# Patient Record
Sex: MALE | Race: WHITE | NOT HISPANIC OR LATINO | Employment: PART TIME | URBAN - METROPOLITAN AREA
[De-identification: names, ages, dates, MRNs, and addresses within clinical notes are randomized per-mention and may not be internally consistent; named-entity substitution may affect disease eponyms.]

---

## 2017-01-30 ENCOUNTER — DOCUMENTATION ONLY (OUTPATIENT)
Dept: LAB | Facility: CLINIC | Age: 57
End: 2017-01-30

## 2017-01-30 DIAGNOSIS — I10 HYPERTENSION GOAL BP (BLOOD PRESSURE) < 140/90: ICD-10-CM

## 2017-01-30 DIAGNOSIS — Z13.6 CARDIOVASCULAR SCREENING; LDL GOAL LESS THAN 130: Primary | ICD-10-CM

## 2017-01-30 DIAGNOSIS — Z00.00 ROUTINE GENERAL MEDICAL EXAMINATION AT A HEALTH CARE FACILITY: ICD-10-CM

## 2017-01-30 DIAGNOSIS — Z12.5 SCREENING FOR PROSTATE CANCER: ICD-10-CM

## 2017-01-30 DIAGNOSIS — Z11.59 NEED FOR HEPATITIS C SCREENING TEST: ICD-10-CM

## 2017-01-30 NOTE — PROGRESS NOTES
This patient has a lab only appointment on 2/2/2017 but does not have future orders. Please review, associate diagnosis and sign pending lab orders for the upcoming appointment.  He has an appointment with Dr. Casas on 2/13/2017.    Thank you,    [facility]

## 2017-02-02 DIAGNOSIS — Z13.6 CARDIOVASCULAR SCREENING; LDL GOAL LESS THAN 130: ICD-10-CM

## 2017-02-02 DIAGNOSIS — Z12.5 SCREENING FOR PROSTATE CANCER: ICD-10-CM

## 2017-02-02 DIAGNOSIS — I10 HYPERTENSION GOAL BP (BLOOD PRESSURE) < 140/90: ICD-10-CM

## 2017-02-02 DIAGNOSIS — Z00.00 ROUTINE GENERAL MEDICAL EXAMINATION AT A HEALTH CARE FACILITY: ICD-10-CM

## 2017-02-02 DIAGNOSIS — Z11.59 NEED FOR HEPATITIS C SCREENING TEST: ICD-10-CM

## 2017-02-02 LAB
ALBUMIN SERPL-MCNC: 3.9 G/DL (ref 3.4–5)
ALP SERPL-CCNC: 63 U/L (ref 40–150)
ALT SERPL W P-5'-P-CCNC: 47 U/L (ref 0–70)
ANION GAP SERPL CALCULATED.3IONS-SCNC: 12 MMOL/L (ref 3–14)
AST SERPL W P-5'-P-CCNC: 24 U/L (ref 0–45)
BILIRUB SERPL-MCNC: 0.6 MG/DL (ref 0.2–1.3)
BUN SERPL-MCNC: 15 MG/DL (ref 7–30)
CALCIUM SERPL-MCNC: 10.2 MG/DL (ref 8.5–10.1)
CHLORIDE SERPL-SCNC: 108 MMOL/L (ref 94–109)
CHOLEST SERPL-MCNC: 189 MG/DL
CO2 SERPL-SCNC: 23 MMOL/L (ref 20–32)
CREAT SERPL-MCNC: 1.03 MG/DL (ref 0.66–1.25)
ERYTHROCYTE [DISTWIDTH] IN BLOOD BY AUTOMATED COUNT: 13.9 % (ref 10–15)
GFR SERPL CREATININE-BSD FRML MDRD: 75 ML/MIN/1.7M2
GLUCOSE SERPL-MCNC: 83 MG/DL (ref 70–99)
HCT VFR BLD AUTO: 49.6 % (ref 40–53)
HDLC SERPL-MCNC: 40 MG/DL
HGB BLD-MCNC: 16.5 G/DL (ref 13.3–17.7)
LDLC SERPL CALC-MCNC: 126 MG/DL
MCH RBC QN AUTO: 28 PG (ref 26.5–33)
MCHC RBC AUTO-ENTMCNC: 33.3 G/DL (ref 31.5–36.5)
MCV RBC AUTO: 84 FL (ref 78–100)
NONHDLC SERPL-MCNC: 149 MG/DL
PLATELET # BLD AUTO: 212 10E9/L (ref 150–450)
POTASSIUM SERPL-SCNC: 4.7 MMOL/L (ref 3.4–5.3)
PROT SERPL-MCNC: 7.4 G/DL (ref 6.8–8.8)
PSA SERPL-ACNC: 1.5 UG/L (ref 0–4)
RBC # BLD AUTO: 5.89 10E12/L (ref 4.4–5.9)
SODIUM SERPL-SCNC: 143 MMOL/L (ref 133–144)
TRIGL SERPL-MCNC: 117 MG/DL
WBC # BLD AUTO: 7.9 10E9/L (ref 4–11)

## 2017-02-02 PROCEDURE — 80053 COMPREHEN METABOLIC PANEL: CPT | Performed by: FAMILY MEDICINE

## 2017-02-02 PROCEDURE — 36415 COLL VENOUS BLD VENIPUNCTURE: CPT | Performed by: FAMILY MEDICINE

## 2017-02-02 PROCEDURE — 85027 COMPLETE CBC AUTOMATED: CPT | Performed by: FAMILY MEDICINE

## 2017-02-02 PROCEDURE — 86803 HEPATITIS C AB TEST: CPT | Performed by: FAMILY MEDICINE

## 2017-02-02 PROCEDURE — G0103 PSA SCREENING: HCPCS | Performed by: FAMILY MEDICINE

## 2017-02-02 PROCEDURE — 80061 LIPID PANEL: CPT | Performed by: FAMILY MEDICINE

## 2017-02-03 LAB — HCV AB SERPL QL IA: NORMAL

## 2017-02-09 NOTE — PROGRESS NOTES
SUBJECTIVE:     CC: Jeff Brown is an 56 year old male who presents for preventative health visit.     Taking calcium supplement but NOT vitaminD.     Healthy Habits:          PROBLEMS TO ADD ON...    Today's PHQ-2 Score:   PHQ-2 ( 1999 Pfizer) 12/31/2015 12/30/2014   Q1: Little interest or pleasure in doing things 0 0   Q2: Feeling down, depressed or hopeless 0 0   PHQ-2 Score 0 0       Abuse: Current or Past(Physical, Sexual or Emotional)- No  Do you feel safe in your environment - No    Social History   Substance Use Topics     Smoking status: Never Smoker      Smokeless tobacco: Not on file     Alcohol Use: No     Limited ALCOHOL    Last PSA:   PSA   Date Value Ref Range Status   02/02/2017 1.50 0 - 4 ug/L Final     Comment:     Assay Method:  Chemiluminescence using Siemens Vista analyzer       Recent Labs   Lab Test  02/02/17   0705  12/31/15   0729  12/09/14   0821  03/27/13   0842   CHOL  189  217*  183  187   HDL  40  37*  39*  36*   LDL  126*  147*  121  127   TRIG  117  165*  113  116   CHOLHDLRATIO   --    --   4.7  5.1*   NHDL  149*  180*   --    --        Reviewed orders with patient. Reviewed health maintenance and updated orders accordingly - Yes    All Histories reviewed and updated in Epic.      ROS:  C: NEGATIVE for fever, chills, change in weight  I: NEGATIVE for worrisome rashes, moles or lesions  E: NEGATIVE for vision changes or irritation. One year ok. No major changes.   ENT: NEGATIVE for ear, mouth and throat problems. No marcus noted.   R: NEGATIVE for significant cough or SOB  CV: NEGATIVE for chest pain, palpitations or peripheral edema. Good exercise   GI: NEGATIVE for nausea, abdominal pain, heartburn, or change in bowel habits. No black or bloody stools except past hemorrhoids   male: negative for dysuria, hematuria, decreased urinary stream, erectile dysfunction, urethral discharge  M: NEGATIVE for significant arthralgias or myalgia  N: NEGATIVE for weakness, dizziness or  "paresthesias  E: NEGATIVE for temperature intolerance, skin/hair changes  H: NEGATIVE for bleeding problems  P: NEGATIVE for changes in mood or affect    Problem list, Medication list, Allergies, and Medical/Social/Surgical histories reviewed in [facility] and updated as appropriate.  OBJECTIVE:     There were no vitals taken for this visit.   /89 (BP Location: Right arm, Patient Position: Chair, Cuff Size: Adult Large)  Pulse 82  Temp 97.3  F (36.3  C) (Oral)  Ht 6' 1\" (1.854 m)  Wt 284 lb (128.8 kg)  BMI 37.47 kg/m2   EXAM:  GENERAL: healthy, alert and no distress  EYES: Eyes grossly normal to inspection, PERRL and conjunctivae and sclerae normal  HENT: ear canals and TM's normal, nose and mouth without ulcers or lesions  NECK: no adenopathy, no asymmetry, masses, or scars and thyroid normal to palpation  RESP: lungs clear to auscultation - no rales, rhonchi or wheezes  BREAST: normal without masses, tenderness or nipple discharge and no palpable axillary masses or adenopathy  CV: regular rate and rhythm, normal S1 S2, no S3 or S4, no murmur, click or rub, no peripheral edema and peripheral pulses strong  ABDOMEN: soft, nontender, no hepatosplenomegaly, no masses and bowel sounds normal   (male): patient deferred exam  MS: no gross musculoskeletal defects noted, no edema  SKIN: no suspicious lesions or rashes  NEURO: Normal strength and tone, mentation intact and speech normal  BACK: no CVA tenderness, no paralumbar tenderness  PSYCH: mentation appears normal, affect normal/bright  LYMPH: no cervical, supraclavicular, axillary, or inguinal adenopathy    ASSESSMENT/PLAN:       ASSESSMENT / PLAN:  (Z00.00) Routine general medical examination at a health care facility  (primary encounter diagnosis)  Comment: generally healthy and normal labs/exam.  Plan: Reviewed self mole/testicle check handout.  Repeat colonoscopy 4 years    (Z23) Need for prophylactic vaccination and inoculation against influenza  Plan: FLU VAC, " "SPLIT VIRUS IM > 3 YO (QUADRIVALENT)         [48728]            (I10) Hypertension goal BP (blood pressure) < 140/90  Comment: stable  Plan: valsartan-hydrochlorothiazide (DIOVAN HCT)         320-25 MG per tablet, amLODIPine (NORVASC) 5 MG        tablet        Exercise and 5 lbs weight loss. Chest pain or shortness of breath to er. Lipids slightly high. Continue asa/fish oil.     (E83.52) Hypercalcemia  Comment: likely from supplement  Plan: stop calcium and start vitaminD. Recheck in 6 months  Expected course and warning signs reviewed. Call/email with questions/concerns.           COUNSELING:  Reviewed preventive health counseling, as reflected in patient instructions       Regular exercise       Healthy diet/nutrition       Vision screening       Aspirin Prophylaxsis       Colon cancer screening       Prostate cancer screening       Osteoporosis Prevention/Bone Health         reports that he has never smoked. He does not have any smokeless tobacco history on file.    Estimated body mass index is 36.57 kg/(m^2) as calculated from the following:    Height as of 12/31/15: 6' 1.5\" (1.867 m).    Weight as of 12/31/15: 281 lb (127.461 kg).   Weight management plan: Discussed healthy diet and exercise guidelines and patient will follow up in 6 months in clinic to re-evaluate.    Counseling Resources:  ATP IV Guidelines  Pooled Cohorts Equation Calculator  FRAX Risk Assessment  ICSI Preventive Guidelines  Dietary Guidelines for Americans, 2010  USDA's MyPlate  ASA Prophylaxis  Lung CA Screening    Margarito Casas MD  [facility]  "

## 2017-02-13 ENCOUNTER — OFFICE VISIT (OUTPATIENT)
Dept: FAMILY MEDICINE | Facility: CLINIC | Age: 57
End: 2017-02-13
Payer: OTHER GOVERNMENT

## 2017-02-13 VITALS
BODY MASS INDEX: 37.64 KG/M2 | TEMPERATURE: 97.3 F | DIASTOLIC BLOOD PRESSURE: 89 MMHG | WEIGHT: 284 LBS | HEIGHT: 73 IN | HEART RATE: 82 BPM | SYSTOLIC BLOOD PRESSURE: 138 MMHG

## 2017-02-13 DIAGNOSIS — I10 HYPERTENSION GOAL BP (BLOOD PRESSURE) < 140/90: ICD-10-CM

## 2017-02-13 DIAGNOSIS — Z00.00 ROUTINE GENERAL MEDICAL EXAMINATION AT A HEALTH CARE FACILITY: Primary | ICD-10-CM

## 2017-02-13 DIAGNOSIS — Z23 NEED FOR PROPHYLACTIC VACCINATION AND INOCULATION AGAINST INFLUENZA: ICD-10-CM

## 2017-02-13 DIAGNOSIS — E83.52 HYPERCALCEMIA: ICD-10-CM

## 2017-02-13 PROCEDURE — 90471 IMMUNIZATION ADMIN: CPT | Performed by: FAMILY MEDICINE

## 2017-02-13 PROCEDURE — 99396 PREV VISIT EST AGE 40-64: CPT | Mod: 25 | Performed by: FAMILY MEDICINE

## 2017-02-13 PROCEDURE — 90686 IIV4 VACC NO PRSV 0.5 ML IM: CPT | Performed by: FAMILY MEDICINE

## 2017-02-13 RX ORDER — VALSARTAN AND HYDROCHLOROTHIAZIDE 320; 25 MG/1; MG/1
1 TABLET, FILM COATED ORAL DAILY
Qty: 90 TABLET | Refills: 3 | Status: SHIPPED | OUTPATIENT
Start: 2017-02-13 | End: 2018-02-19

## 2017-02-13 RX ORDER — AMLODIPINE BESYLATE 5 MG/1
5 TABLET ORAL DAILY
Qty: 90 TABLET | Refills: 3 | Status: SHIPPED | OUTPATIENT
Start: 2017-02-13 | End: 2018-02-19

## 2017-02-13 NOTE — NURSING NOTE
"Chief Complaint   Patient presents with     Physical       Initial /89 (BP Location: Right arm, Patient Position: Chair, Cuff Size: Adult Large)  Pulse 82  Temp 97.3  F (36.3  C) (Oral)  Ht 6' 1\" (1.854 m)  Wt 284 lb (128.8 kg)  BMI 37.47 kg/m2 Estimated body mass index is 37.47 kg/(m^2) as calculated from the following:    Height as of this encounter: 6' 1\" (1.854 m).    Weight as of this encounter: 284 lb (128.8 kg).  Medication Reconciliation: complete   Wilma Arce CMA    "

## 2017-02-13 NOTE — PROGRESS NOTES
Injectable Influenza Immunization Documentation    1.  Is the person to be vaccinated sick today?  No    2. Does the person to be vaccinated have an allergy to eggs or to a component of the vaccine?  No    3. Has the person to be vaccinated today ever had a serious reaction to influenza vaccine in the past?  No    4. Has the person to be vaccinated ever had Guillain-McGrann syndrome?  No     Form completed by Wilma Arce CMA

## 2017-02-13 NOTE — MR AVS SNAPSHOT
After Visit Summary   2/13/2017    Jeff Brown    MRN: [id number]           Patient Information     Date Of Birth          1960        Visit Information        Provider Department      2/13/2017 7:15 AM Margarito Casas MD [facility]        Today's Diagnoses     Routine general medical examination at a health care facility    -  1    Need for prophylactic vaccination and inoculation against influenza        Hypertension goal BP (blood pressure) < 140/90        Hypercalcemia          Care Instructions      Preventive Health Recommendations  Male Ages 50 - 64    Yearly exam:             See your health care provider every year in order to  o   Review health changes.   o   Discuss preventive care.    o   Review your medicines if your doctor has prescribed any.     Have a cholesterol test every 5 years, or more frequently if you are at risk for high cholesterol/heart disease.     Have a diabetes test (fasting glucose) every three years. If you are at risk for diabetes, you should have this test more often.     Have a colonoscopy at age 50, or have a yearly FIT test (stool test). These exams will check for colon cancer.      Talk with your health care provider about whether or not a prostate cancer screening test (PSA) is right for you.    You should be tested each year for STDs (sexually transmitted diseases), if you re at risk.     Shots: Get a flu shot each year. Get a tetanus shot every 10 years.     Nutrition:    Eat at least 5 servings of fruits and vegetables daily.     Eat whole-grain bread, whole-wheat pasta and brown rice instead of white grains and rice.     Talk to your provider about Calcium and Vitamin D.     Lifestyle    Exercise for at least 150 minutes a week (30 minutes a day, 5 days a week). This will help you control your weight and prevent disease.     Limit alcohol to one drink per day.     No smoking.     Wear sunscreen to prevent skin cancer.     See your  "dentist every six months for an exam and cleaning.     See your eye doctor every 1 to 2 years.            Follow-ups after your visit        Who to contact     If you have questions or need follow up information about today's clinic visit or your schedule please contact [facility] directly at [phone].  Normal or non-critical lab and imaging results will be communicated to you by MyChart, letter or phone within 4 business days after the clinic has received the results. If you do not hear from us within 7 days, please contact the clinic through [facility] or phone. If you have a critical or abnormal lab result, we will notify you by phone as soon as possible.  Submit refill requests through [employer] or call your pharmacy and they will forward the refill request to us. Please allow 3 business days for your refill to be completed.          Additional Information About Your Visit        MyChart Information     GiveNext gives you secure access to your electronic health record. If you see a primary care provider, you can also send messages to your care team and make appointments. If you have questions, please call your primary care clinic.  If you do not have a primary care provider, please call [phone] and they will assist you.        Care EveryWhere ID     This is your Care EveryWhere ID. This could be used by other organizations to access your [place] medical records  [id number]        Your Vitals Were     Pulse Temperature Height BMI (Body Mass Index)          82 97.3  F (36.3  C) (Oral) 6' 1\" (1.854 m) 37.47 kg/m2         Blood Pressure from Last 3 Encounters:   02/13/17 138/89   12/31/15 110/86   12/30/14 130/90    Weight from Last 3 Encounters:   02/13/17 284 lb (128.8 kg)   12/31/15 281 lb (127.5 kg)   12/30/14 278 lb (126.1 kg)              We Performed the Following     FLU VAC, SPLIT VIRUS IM > 3 YO (QUADRIVALENT) [43552]          Where to get your medicines      These medications were sent " to [facility] [address] [place], [place] - [address] [facility]  [address] [facility] [address]     Phone:  [phone]     amLODIPine 5 MG tablet    valsartan-hydrochlorothiazide 320-25 MG per tablet          Primary Care Provider Office Phone # Fax #    Margarito Vinod Casas [phone]       [facility] [address] [address] [facility] [address]        Thank you!     Thank you for choosing [facility]  for your care. Our goal is always to provide you with excellent care. Hearing back from our patients is one way we can continue to improve our services. Please take a few minutes to complete the written survey that you may receive in the mail after your visit with us. Thank you!             Your Updated Medication List - Protect others around you: Learn how to safely use, store and throw away your medicines at www.disposemymeds.org.          This list is accurate as of: 2/13/17  7:46 AM.  Always use your most recent med list.                   Brand Name Dispense Instructions for use    amLODIPine 5 MG tablet    NORVASC    90 tablet    Take 1 tablet (5 mg) by mouth daily Pharmacy ok to hold prescription until due       ASPIRIN LOW DOSE 81 MG tablet   Generic drug:  aspirin      Take 1 tablet by mouth daily.       FISH OIL      2 tablets daily gummy       * UNABLE TO FIND      Fiber gummy supplement       * UNABLE TO FIND      daily. MEDICATION NAME: Gummy Calcium Supplement       valsartan-hydrochlorothiazide 320-25 MG per tablet    DIOVAN HCT    90 tablet    Take 1 tablet by mouth daily Pharmacy ok to hold prescription until due       * Notice:  This list has 2 medication(s) that are the same as other medications prescribed for you. Read the directions carefully, and ask your doctor or other care provider to review them with you.

## 2017-02-13 NOTE — NURSING NOTE
Screening Questionnaire for Adult Immunization    Are you sick today?   No   Do you have allergies to medications, food, a vaccine component or latex?   No   Have you ever had a serious reaction after receiving a vaccination?   No   Do you have a long-term health problem with heart disease, lung disease, asthma, kidney disease, metabolic disease (e.g. diabetes), anemia, or other blood disorder?   No   Do you have cancer, leukemia, HIV/AIDS, or any other immune system problem?   No   In the past 3 months, have you taken medications that affect  your immune system, such as prednisone, other steroids, or anticancer drugs; drugs for the treatment of rheumatoid arthritis, Crohn s disease, or psoriasis; or have you had radiation treatments?   No   Have you had a seizure, or a brain or other nervous system problem?   No   During the past year, have you received a transfusion of blood or blood     products, or been given immune (gamma) globulin or antiviral drug?   No   For women: Are you pregnant or is there a chance you could become        pregnant during the next month?   No   Have you received any vaccinations in the past 4 weeks?   No     Immunization questionnaire answers were all negative.      MNVFC doesn't apply on this patient    Per orders of Dr. hi, injection of flu given by Wilma Arce. Patient instructed to remain in clinic for 20 minutes afterwards, and to report any adverse reaction to me immediately.       Screening performed by Wilma Arce on 2/13/2017 at 7:24 AM.

## 2018-02-12 ENCOUNTER — DOCUMENTATION ONLY (OUTPATIENT)
Dept: LAB | Facility: CLINIC | Age: 58
End: 2018-02-12

## 2018-02-12 DIAGNOSIS — I10 HYPERTENSION GOAL BP (BLOOD PRESSURE) < 140/90: ICD-10-CM

## 2018-02-12 DIAGNOSIS — Z00.00 ROUTINE GENERAL MEDICAL EXAMINATION AT A HEALTH CARE FACILITY: ICD-10-CM

## 2018-02-12 DIAGNOSIS — Z13.6 CARDIOVASCULAR SCREENING; LDL GOAL LESS THAN 130: Primary | ICD-10-CM

## 2018-02-12 DIAGNOSIS — Z12.5 SCREENING FOR PROSTATE CANCER: ICD-10-CM

## 2018-02-12 NOTE — PROGRESS NOTES
Patient has a lab appointment on 2/15/2018. Please sign pended orders and place any other future orders that are needed.    Thank you,  Nubia Judd

## 2018-02-15 DIAGNOSIS — Z13.6 CARDIOVASCULAR SCREENING; LDL GOAL LESS THAN 130: ICD-10-CM

## 2018-02-15 DIAGNOSIS — I10 HYPERTENSION GOAL BP (BLOOD PRESSURE) < 140/90: ICD-10-CM

## 2018-02-15 DIAGNOSIS — Z12.5 SCREENING FOR PROSTATE CANCER: ICD-10-CM

## 2018-02-15 DIAGNOSIS — Z00.00 ROUTINE GENERAL MEDICAL EXAMINATION AT A HEALTH CARE FACILITY: ICD-10-CM

## 2018-02-15 LAB
ALBUMIN SERPL-MCNC: 4.1 G/DL (ref 3.4–5)
ALBUMIN UR-MCNC: NEGATIVE MG/DL
ALP SERPL-CCNC: 75 U/L (ref 40–150)
ALT SERPL W P-5'-P-CCNC: 52 U/L (ref 0–70)
ANION GAP SERPL CALCULATED.3IONS-SCNC: 8 MMOL/L (ref 3–14)
APPEARANCE UR: CLEAR
AST SERPL W P-5'-P-CCNC: 25 U/L (ref 0–45)
BILIRUB SERPL-MCNC: 0.6 MG/DL (ref 0.2–1.3)
BILIRUB UR QL STRIP: NEGATIVE
BUN SERPL-MCNC: 13 MG/DL (ref 7–30)
CALCIUM SERPL-MCNC: 10 MG/DL (ref 8.5–10.1)
CHLORIDE SERPL-SCNC: 106 MMOL/L (ref 94–109)
CHOLEST SERPL-MCNC: 176 MG/DL
CO2 SERPL-SCNC: 24 MMOL/L (ref 20–32)
COLOR UR AUTO: YELLOW
CREAT SERPL-MCNC: 0.95 MG/DL (ref 0.66–1.25)
ERYTHROCYTE [DISTWIDTH] IN BLOOD BY AUTOMATED COUNT: 14.2 % (ref 10–15)
GFR SERPL CREATININE-BSD FRML MDRD: 82 ML/MIN/1.7M2
GLUCOSE SERPL-MCNC: 97 MG/DL (ref 70–99)
GLUCOSE UR STRIP-MCNC: NEGATIVE MG/DL
HCT VFR BLD AUTO: 51.7 % (ref 40–53)
HDLC SERPL-MCNC: 49 MG/DL
HGB BLD-MCNC: 17 G/DL (ref 13.3–17.7)
HGB UR QL STRIP: NEGATIVE
KETONES UR STRIP-MCNC: NEGATIVE MG/DL
LDLC SERPL CALC-MCNC: 114 MG/DL
LEUKOCYTE ESTERASE UR QL STRIP: NEGATIVE
MCH RBC QN AUTO: 27.7 PG (ref 26.5–33)
MCHC RBC AUTO-ENTMCNC: 32.9 G/DL (ref 31.5–36.5)
MCV RBC AUTO: 84 FL (ref 78–100)
NITRATE UR QL: NEGATIVE
NONHDLC SERPL-MCNC: 127 MG/DL
PH UR STRIP: 5 PH (ref 5–7)
PLATELET # BLD AUTO: 205 10E9/L (ref 150–450)
POTASSIUM SERPL-SCNC: 4 MMOL/L (ref 3.4–5.3)
PROT SERPL-MCNC: 8.1 G/DL (ref 6.8–8.8)
PSA SERPL-ACNC: 2.13 UG/L (ref 0–4)
RBC # BLD AUTO: 6.13 10E12/L (ref 4.4–5.9)
SODIUM SERPL-SCNC: 138 MMOL/L (ref 133–144)
SOURCE: NORMAL
SP GR UR STRIP: 1.02 (ref 1–1.03)
TRIGL SERPL-MCNC: 64 MG/DL
UROBILINOGEN UR STRIP-ACNC: 0.2 EU/DL (ref 0.2–1)
WBC # BLD AUTO: 9.4 10E9/L (ref 4–11)

## 2018-02-15 PROCEDURE — 85027 COMPLETE CBC AUTOMATED: CPT | Performed by: FAMILY MEDICINE

## 2018-02-15 PROCEDURE — 80061 LIPID PANEL: CPT | Performed by: FAMILY MEDICINE

## 2018-02-15 PROCEDURE — G0103 PSA SCREENING: HCPCS | Performed by: FAMILY MEDICINE

## 2018-02-15 PROCEDURE — 80053 COMPREHEN METABOLIC PANEL: CPT | Performed by: FAMILY MEDICINE

## 2018-02-15 PROCEDURE — 81003 URINALYSIS AUTO W/O SCOPE: CPT | Performed by: FAMILY MEDICINE

## 2018-02-15 PROCEDURE — 36415 COLL VENOUS BLD VENIPUNCTURE: CPT | Performed by: FAMILY MEDICINE

## 2018-02-19 ENCOUNTER — OFFICE VISIT (OUTPATIENT)
Dept: FAMILY MEDICINE | Facility: CLINIC | Age: 58
End: 2018-02-19
Payer: OTHER GOVERNMENT

## 2018-02-19 VITALS
WEIGHT: 279 LBS | HEART RATE: 88 BPM | OXYGEN SATURATION: 98 % | SYSTOLIC BLOOD PRESSURE: 122 MMHG | DIASTOLIC BLOOD PRESSURE: 82 MMHG | TEMPERATURE: 97.6 F | HEIGHT: 74 IN | BODY MASS INDEX: 35.81 KG/M2

## 2018-02-19 DIAGNOSIS — Z00.00 ROUTINE GENERAL MEDICAL EXAMINATION AT A HEALTH CARE FACILITY: Primary | ICD-10-CM

## 2018-02-19 DIAGNOSIS — Z13.6 CARDIOVASCULAR SCREENING; LDL GOAL LESS THAN 130: ICD-10-CM

## 2018-02-19 DIAGNOSIS — I10 HYPERTENSION GOAL BP (BLOOD PRESSURE) < 140/90: ICD-10-CM

## 2018-02-19 PROCEDURE — 99396 PREV VISIT EST AGE 40-64: CPT | Performed by: FAMILY MEDICINE

## 2018-02-19 RX ORDER — VALSARTAN AND HYDROCHLOROTHIAZIDE 320; 25 MG/1; MG/1
1 TABLET, FILM COATED ORAL DAILY
Qty: 90 TABLET | Refills: 3 | Status: SHIPPED | OUTPATIENT
Start: 2018-02-19 | End: 2019-02-26

## 2018-02-19 RX ORDER — AMLODIPINE BESYLATE 5 MG/1
5 TABLET ORAL DAILY
Qty: 90 TABLET | Refills: 3 | Status: SHIPPED | OUTPATIENT
Start: 2018-02-19 | End: 2019-02-26

## 2018-02-19 NOTE — PROGRESS NOTES
SUBJECTIVE:   CC: Jeff Brown is an 57 year old male who presents for preventative health visit.   Follow-up htn. Lipids ok. Stopped calcium but taking vitaminD.   Outside blood pressure reading ok.   Exercise - walking lots daily from work.   No chest pain or shortness of breath.   Physical   Annual:     Getting at least 3 servings of Calcium per day::  NO    Bi-annual eye exam::  Yes    Dental care twice a year::  Yes    Sleep apnea or symptoms of sleep apnea::  None    Diet::  Regular (no restrictions)    Frequency of exercise::  4-5 days/week    Duration of exercise::  30-45 minutes    Taking medications regularly::  Yes    Medication side effects::  None    Additional concerns today::  YES            PROBLEMS TO ADD ON...  Today's PHQ-2 Score:   PHQ-2 ( 1999 Pfizer) 2/19/2018   Q1: Little interest or pleasure in doing things 0   Q2: Feeling down, depressed or hopeless 0   PHQ-2 Score 0   Q1: Little interest or pleasure in doing things Not at all   Q2: Feeling down, depressed or hopeless Not at all   PHQ-2 Score 0       Abuse: Current or Past(Physical, Sexual or Emotional)- No  Do you feel safe in your environment - Yes    Social History   Substance Use Topics     Smoking status: Never Smoker     Smokeless tobacco: Not on file     Alcohol use No     Alcohol Use 2/19/2018   If you drink alcohol, do you typically have greater than 3 drinks per day OR greater than 7 drinks per week?   No       Last PSA:   PSA   Date Value Ref Range Status   02/15/2018 2.13 0 - 4 ug/L Final     Comment:     Assay Method:  Chemiluminescence using Siemens Vista analyzer       Reviewed orders with patient. Reviewed health maintenance and updated orders accordingly - Yes  Labs reviewed in EPIC    Reviewed and updated as needed this visit by clinical staff         Reviewed and updated as needed this visit by Provider            Review of Systems  C: NEGATIVE for fever, chills, change in weight  I: NEGATIVE for worrisome rashes,  "moles or lesions  E: NEGATIVE for vision changes or irritation. Eye exam in summer ok.   ENT: NEGATIVE for ear, mouth and throat problems. Winter congestion - no sudafed.   R: NEGATIVE for significant cough or SOB  CV: NEGATIVE for chest pain, palpitations or peripheral edema  GI: NEGATIVE for nausea, abdominal pain, heartburn, or change in bowel habits. No black or bloody stools.    male: negative for dysuria, hematuria, decreased urinary stream, erectile dysfunction, urethral discharge. Once nocturia hit/miss with fluids before bedtime.  M: NEGATIVE for significant arthralgias or myalgia  N: NEGATIVE for weakness, dizziness or paresthesias  E: NEGATIVE for temperature intolerance, skin/hair changes  H: NEGATIVE for bleeding problems  P: NEGATIVE for changes in mood or affect. Stress from mom moving into assisted living and she has dementia issues. Mom in [place]. No SUICIAL IDEATION OR HOMOCIDAL IDEATION OR ROBERTA.    OBJECTIVE:   There were no vitals taken for this visit.   /82  Pulse 88  Temp 97.6  F (36.4  C) (Oral)  Ht 6' 1.5\" (1.867 m)  Wt 279 lb (126.6 kg)  SpO2 98%  BMI 36.31 kg/m2  Physical Exam  GENERAL: healthy, alert and no distress  EYES: Eyes grossly normal to inspection, PERRL and conjunctivae and sclerae normal  HENT: ear canals and TM's normal, nose and mouth without ulcers or lesions  NECK: no adenopathy, no asymmetry, masses, or scars and thyroid normal to palpation  RESP: lungs clear to auscultation - no rales, rhonchi or wheezes  BREAST: normal without masses, tenderness or nipple discharge and no palpable axillary masses or adenopathy  CV: regular rate and rhythm, normal S1 S2, no S3 or S4, no murmur, click or rub, no peripheral edema and peripheral pulses strong  ABDOMEN: soft, nontender, no hepatosplenomegaly, no masses and bowel sounds normal   (male): patient deferred /rectal exams today.  MS: no gross musculoskeletal defects noted, no edema  SKIN: no suspicious " "lesions or rashes  NEURO: Normal strength and tone, mentation intact and speech normal  BACK: no CVA tenderness, no paralumbar tenderness  PSYCH: mentation appears normal, affect normal/bright  LYMPH: no cervical, supraclavicular, axillary, or inguinal adenopathy    ASSESSMENT/PLAN:   ASSESSMENT / PLAN:  (Z00.00) Routine general medical examination at a health care facility  (primary encounter diagnosis)  Comment: generally healthy and normal exam/labs.  Plan: Reviewed self mole/testicle check handout.  Continue vitamind    (I10) Hypertension goal BP (blood pressure) < 140/90  Comment: stable  Plan: valsartan-hydrochlorothiazide (DIOVAN HCT)         320-25 MG per tablet, amLODIPine (NORVASC) 5 MG        tablet        Continue exercise. Chest pain or shortness of breath to er. Reveiwed risks and side effects of medication  Return to clinic if worse. Continue self-monitor    (Z68.36) BMI 36.0-36.9,adult  Plan: exercise and diet discussed.     (Z13.6) CARDIOVASCULAR SCREENING; LDL GOAL LESS THAN 130  Comment: ok  Plan: continue fish oil/exercise.         COUNSELING:   Reviewed preventive health counseling, as reflected in patient instructions       Regular exercise       Colon cancer screening       Prostate cancer screening       Osteoporosis Prevention/Bone Health         reports that he has never smoked. He does not have any smokeless tobacco history on file.    Estimated body mass index is 37.47 kg/(m^2) as calculated from the following:    Height as of 2/13/17: 6' 1\" (1.854 m).    Weight as of 2/13/17: 284 lb (128.8 kg).   Weight management plan: Discussed healthy diet and exercise guidelines and patient will follow up in 12 months in clinic to re-evaluate.    Counseling Resources:  ATP IV Guidelines  Pooled Cohorts Equation Calculator  FRAX Risk Assessment  ICSI Preventive Guidelines  Dietary Guidelines for Americans, 2010  USDA's MyPlate  ASA Prophylaxis  Lung CA Screening    Margarito Casas MD  [facility]  Answers for HPI/ROS submitted by the patient on 2/19/2018   PHQ-2 Score: 0

## 2018-02-19 NOTE — MR AVS SNAPSHOT
After Visit Summary   2/19/2018    Jeff Brown    MRN: [id number]           Patient Information     Date Of Birth          1960        Visit Information        Provider Department      2/19/2018 11:35 AM Margarito Casas MD [facility]        Today's Diagnoses     Routine general medical examination at a health care facility    -  1    Hypertension goal BP (blood pressure) < 140/90        BMI 36.0-36.9,adult        CARDIOVASCULAR SCREENING; LDL GOAL LESS THAN 130           Follow-ups after your visit        Who to contact     If you have questions or need follow up information about today's clinic visit or your schedule please contact [facility] directly at [phone].  Normal or non-critical lab and imaging results will be communicated to you by MyChart, letter or phone within 4 business days after the clinic has received the results. If you do not hear from us within 7 days, please contact the clinic through virtual [employer]ens l[employer] or phone. If you have a critical or abnormal lab result, we will notify you by phone as soon as possible.  Submit refill requests through [employer] or call your pharmacy and they will forward the refill request to us. Please allow 3 business days for your refill to be completed.          Additional Information About Your Visit        MyChart Information     [employer] gives you secure access to your electronic health record. If you see a primary care provider, you can also send messages to your care team and make appointments. If you have questions, please call your primary care clinic.  If you do not have a primary care provider, please call [phone] and they will assist you.        Care EveryWhere ID     This is your Care EveryWhere ID. This could be used by other organizations to access your [place] medical records  [id number]        Your Vitals Were     Pulse Temperature Height Pulse Oximetry BMI (Body Mass Index)       88 97.6  F (36.4  C)  "(Oral) 6' 1.5\" (1.867 m) 98% 36.31 kg/m2        Blood Pressure from Last 3 Encounters:   02/19/18 122/82   02/13/17 138/89   12/31/15 110/86    Weight from Last 3 Encounters:   02/19/18 279 lb (126.6 kg)   02/13/17 284 lb (128.8 kg)   12/31/15 281 lb (127.5 kg)              Today, you had the following     No orders found for display         Where to get your medicines      These medications were sent to [facility] [address] [place], [place] - [address] [facility]  [address] [facility] [address]     Phone:  [phone]     amLODIPine 5 MG tablet    valsartan-hydrochlorothiazide 320-25 MG per tablet          Primary Care Provider Office Phone # Fax #    Margarito Vinod Casas [phone] [address] [facility] [address]        Equal Access to Services     [facility]: Hadii aad ku hadasho Soomaali, waaxda luqadaha, qaybta kaalmada adeegyada, waxay idiin hayjosen eliseo chungarasly casiano . So [facility] [phone].    ATENCIÓN: Si habla español, tiene a ascencio disposición servicios gratuitos de asistencia lingüística. [facility] [phone].    We comply with applicable federal civil rights laws and [place] laws. We do not discriminate on the basis of race, color, national origin, age, disability, sex, sexual orientation, or gender identity.            Thank you!     Thank you for choosing [facility]  for your care. Our goal is always to provide you with excellent care. Hearing back from our patients is one way we can continue to improve our services. Please take a few minutes to complete the written survey that you may receive in the mail after your visit with us. Thank you!             Your Updated Medication List - Protect others around you: Learn how to safely use, store and throw away your medicines at www.disposemymeds.org.          This list is accurate as of 2/19/18 12:08 PM.  Always use your most recent med list.                   Brand Name Dispense Instructions for use " Diagnosis    amLODIPine 5 MG tablet    NORVASC    90 tablet    Take 1 tablet (5 mg) by mouth daily Pharmacy ok to hold prescription until due    Hypertension goal BP (blood pressure) < 140/90       ASPIRIN LOW DOSE 81 MG tablet   Generic drug:  aspirin      Take 1 tablet by mouth daily.        FISH OIL      2 tablets daily gummy        UNABLE TO FIND      Fiber gummy supplement        valsartan-hydrochlorothiazide 320-25 MG per tablet    DIOVAN HCT    90 tablet    Take 1 tablet by mouth daily Pharmacy ok to hold prescription until due    Hypertension goal BP (blood pressure) < 140/90       VITAMIN C PO      Take 500 mg by mouth daily        VITAMIN D (CHOLECALCIFEROL) PO      Take 2,000 Units by mouth daily

## 2018-02-19 NOTE — NURSING NOTE
"Chief Complaint   Patient presents with     Physical       Initial /82  Pulse 88  Temp 97.6  F (36.4  C) (Oral)  Ht 6' 1.5\" (1.867 m)  Wt 279 lb (126.6 kg)  SpO2 98%  BMI 36.31 kg/m2 Estimated body mass index is 36.31 kg/(m^2) as calculated from the following:    Height as of this encounter: 6' 1.5\" (1.867 m).    Weight as of this encounter: 279 lb (126.6 kg).  Medication Reconciliation: complete   Wilma Arce CMA    "

## 2018-05-21 ENCOUNTER — OFFICE VISIT (OUTPATIENT)
Dept: FAMILY MEDICINE | Facility: CLINIC | Age: 58
End: 2018-05-21
Payer: OTHER GOVERNMENT

## 2018-05-21 VITALS
HEART RATE: 89 BPM | RESPIRATION RATE: 16 BRPM | TEMPERATURE: 98.4 F | DIASTOLIC BLOOD PRESSURE: 80 MMHG | OXYGEN SATURATION: 98 % | WEIGHT: 285 LBS | HEIGHT: 74 IN | SYSTOLIC BLOOD PRESSURE: 120 MMHG | BODY MASS INDEX: 36.57 KG/M2

## 2018-05-21 DIAGNOSIS — M79.671 PAIN IN BOTH FEET: Primary | ICD-10-CM

## 2018-05-21 DIAGNOSIS — M79.672 PAIN IN BOTH FEET: Primary | ICD-10-CM

## 2018-05-21 PROCEDURE — 99213 OFFICE O/P EST LOW 20 MIN: CPT | Performed by: FAMILY MEDICINE

## 2018-05-21 ASSESSMENT — PAIN SCALES - GENERAL: PAINLEVEL: MODERATE PAIN (4)

## 2018-05-21 NOTE — NURSING NOTE
"Chief Complaint   Patient presents with     Pain     Health Maintenance     utd       Initial /80  Pulse 89  Temp 98.4  F (36.9  C) (Oral)  Resp 16  Ht 6' 1.5\" (1.867 m)  Wt 285 lb (129.3 kg)  SpO2 98%  BMI 37.09 kg/m2 Estimated body mass index is 37.09 kg/(m^2) as calculated from the following:    Height as of this encounter: 6' 1.5\" (1.867 m).    Weight as of this encounter: 285 lb (129.3 kg).    Wilma Arce, RAMÓN    "

## 2018-05-21 NOTE — MR AVS SNAPSHOT
After Visit Summary   5/21/2018    Jeff Brown    MRN: [id number]           Patient Information     Date Of Birth          1960        Visit Information        Provider Department      5/21/2018 7:15 AM Margarito Casas MD [facility]        Today's Diagnoses     Pain in both feet    -  1       Follow-ups after your visit        Additional Services     PODIATRY/FOOT & ANKLE SURGERY REFERRAL       Your provider has referred you to: [facility]: [facility] [phone]   [email or web address]    Please be aware that coverage of these services is subject to the terms and limitations of your health insurance plan.  Call member services at your health plan with any benefit or coverage questions.      Please bring the following to your appointment:  >>   Any x-rays, CTs or MRIs which have been performed.  Contact the facility where they were done to arrange for  prior to your scheduled appointment.    >>   List of current medications   >>   This referral request   >>   Any documents/labs given to you for this referral                  Who to contact     If you have questions or need follow up information about today's clinic visit or your schedule please contact [facility] directly at [phone].  Normal or non-critical lab and imaging results will be communicated to you by MyChart, letter or phone within 4 business days after the clinic has received the results. If you do not hear from us within 7 days, please contact the clinic through MyChart or phone. If you have a critical or abnormal lab result, we will notify you by phone as soon as possible.  Submit refill requests through [employer] or call your pharmacy and they will forward the refill request to us. Please allow 3 business days for your refill to be completed.          Additional Information About Your Visit        [employer] Information     [employer] gives you secure  "access to your electronic health record. If you see a primary care provider, you can also send messages to your care team and make appointments. If you have questions, please call your primary care clinic.  If you do not have a primary care provider, please call [phone] and they will assist you.        Care EveryWhere ID     This is your Care EveryWhere ID. This could be used by other organizations to access your [place] medical records  [id number]        Your Vitals Were     Pulse Temperature Respirations Height Pulse Oximetry BMI (Body Mass Index)    89 98.4  F (36.9  C) (Oral) 16 6' 1.5\" (1.867 m) 98% 37.09 kg/m2       Blood Pressure from Last 3 Encounters:   05/21/18 120/80   02/19/18 122/82   02/13/17 138/89    Weight from Last 3 Encounters:   05/21/18 285 lb (129.3 kg)   02/19/18 279 lb (126.6 kg)   02/13/17 284 lb (128.8 kg)              We Performed the Following     PODIATRY/FOOT & ANKLE SURGERY REFERRAL          Today's Medication Changes          These changes are accurate as of 5/21/18  7:42 AM.  If you have any questions, ask your nurse or doctor.               Start taking these medicines.        Dose/Directions    nabumetone 750 MG tablet   Commonly known as:  RELAFEN   Used for:  Pain in both feet   Started by:  Margarito Casas MD        Dose:  750 mg   Take 1 tablet (750 mg) by mouth 2 times daily as needed for moderate pain (take with food)   Quantity:  30 tablet   Refills:  0            Where to get your medicines      These medications were sent to [facility] [address] [place] [address] [facility]  [address] [facility] [address]     Phone:  [phone]     nabumetone 750 MG tablet                Primary Care Provider Office Phone # Fax #    Margarito Casas [phone] [address] [facility] [address]        Equal Access to Services     [facility]: Hadii johanny daniels hadasho Soomaali, waaxda luqadaha, qaybta kaalmada adeegyada, sylvester lundbergin " huber casiano ah. So [facility] [phone].    ATENCIÓN: Si juanla dony, tiene a ascencio disposición servicios gratuitos de asistencia lingüística. Suzette flores [phone].    We comply with applicable federal civil rights laws and [place] laws. We do not discriminate on the basis of race, color, national origin, age, disability, sex, sexual orientation, or gender identity.            Thank you!     Thank you for choosing [facility]  for your care. Our goal is always to provide you with excellent care. Hearing back from our patients is one way we can continue to improve our services. Please take a few minutes to complete the written survey that you may receive in the mail after your visit with us. Thank you!             Your Updated Medication List - Protect others around you: Learn how to safely use, store and throw away your medicines at www.disposemymeds.org.          This list is accurate as of 5/21/18  7:42 AM.  Always use your most recent med list.                   Brand Name Dispense Instructions for use Diagnosis    amLODIPine 5 MG tablet    NORVASC    90 tablet    Take 1 tablet (5 mg) by mouth daily Pharmacy ok to hold prescription until due    Hypertension goal BP (blood pressure) < 140/90       ASPIRIN LOW DOSE 81 MG tablet   Generic drug:  aspirin      Take 1 tablet by mouth daily.        FISH OIL      2 tablets daily gummy        nabumetone 750 MG tablet    RELAFEN    30 tablet    Take 1 tablet (750 mg) by mouth 2 times daily as needed for moderate pain (take with food)    Pain in both feet       UNABLE TO FIND      Fiber gummy supplement        valsartan-hydrochlorothiazide 320-25 MG per tablet    DIOVAN HCT    90 tablet    Take 1 tablet by mouth daily Pharmacy ok to hold prescription until due    Hypertension goal BP (blood pressure) < 140/90       VITAMIN C PO      Take 500 mg by mouth daily        VITAMIN D (CHOLECALCIFEROL) PO      Take 2,000 Units by mouth daily

## 2018-05-21 NOTE — PROGRESS NOTES
"SUBJECTIVE:  Jeff Brown, a 57 year old male scheduled an appointment to discuss the following issues:  bilateral feet pain. right > left.  Was in [place] to help mom move. Was on feet all day. Metatarsal area. First happened before xmas 4-5 months ago. Changed shoes to see if helpful? No numbness/tingling. No back pain/radiations.   No toes pain.  Past Medical History:   Diagnosis Date     Hypertension        Past Surgical History:   Procedure Laterality Date     COLONOSCOPY  2005     ENT SURGERY      tonsils       Family History   Problem Relation Age of Onset     HEART DISEASE Father 65     htn       Social History   Substance Use Topics     Smoking status: Never Smoker     Smokeless tobacco: Never Used     Alcohol use No       ROS:  All other ROS negative  OBJECTIVE:  /80  Pulse 89  Temp 98.4  F (36.9  C) (Oral)  Resp 16  Ht 6' 1.5\" (1.867 m)  Wt 285 lb (129.3 kg)  SpO2 98%  BMI 37.09 kg/m2  EXAM:  GENERAL APPEARANCE: healthy, alert and no distress  MS: extremities normal- no gross deformities noted, no evidence of inflammation in joints, FROM in all extremities.  MS: tenderness bilateral 2nd MT joint heads.   SKIN: no suspicious lesions or rashes  NEURO: Normal strength and tone, sensory exam grossly normal, mentation intact and speech normal  PSYCH: mentation appears normal and affect normal/bright    ASSESSMENT / PLAN:  (M79.671,  M79.672) Pain in both feet  (primary encounter diagnosis)  Comment: likely metatarsalia   Plan: nabumetone (RELAFEN) 750 MG tablet,         PODIATRY/FOOT & ANKLE SURGERY REFERRAL        Reveiwed risks and side effects of medication  Heat/stretching. Limit relafen usage to 5-7 days and prn. Follow-up podiatry if worse/presists. Expected course and warning signs reviewed..Call/email with questions/concerns. Metatarsalia pads. Weight loss.     Margarito Casas"

## 2019-02-26 ENCOUNTER — OFFICE VISIT (OUTPATIENT)
Dept: FAMILY MEDICINE | Facility: CLINIC | Age: 59
End: 2019-02-26
Payer: OTHER GOVERNMENT

## 2019-02-26 VITALS
DIASTOLIC BLOOD PRESSURE: 86 MMHG | OXYGEN SATURATION: 97 % | WEIGHT: 289 LBS | BODY MASS INDEX: 37.09 KG/M2 | SYSTOLIC BLOOD PRESSURE: 130 MMHG | TEMPERATURE: 98.3 F | HEART RATE: 99 BPM | HEIGHT: 74 IN | RESPIRATION RATE: 20 BRPM

## 2019-02-26 DIAGNOSIS — Z13.6 CARDIOVASCULAR SCREENING; LDL GOAL LESS THAN 130: ICD-10-CM

## 2019-02-26 DIAGNOSIS — Z12.5 SCREENING PSA (PROSTATE SPECIFIC ANTIGEN): ICD-10-CM

## 2019-02-26 DIAGNOSIS — Z23 NEED FOR PROPHYLACTIC VACCINATION AND INOCULATION AGAINST INFLUENZA: ICD-10-CM

## 2019-02-26 DIAGNOSIS — Z00.00 ROUTINE HISTORY AND PHYSICAL EXAMINATION OF ADULT: Primary | ICD-10-CM

## 2019-02-26 DIAGNOSIS — I10 HYPERTENSION GOAL BP (BLOOD PRESSURE) < 140/90: ICD-10-CM

## 2019-02-26 PROCEDURE — 90471 IMMUNIZATION ADMIN: CPT | Performed by: FAMILY MEDICINE

## 2019-02-26 PROCEDURE — 90682 RIV4 VACC RECOMBINANT DNA IM: CPT | Performed by: FAMILY MEDICINE

## 2019-02-26 PROCEDURE — 99396 PREV VISIT EST AGE 40-64: CPT | Performed by: FAMILY MEDICINE

## 2019-02-26 RX ORDER — AMLODIPINE BESYLATE 5 MG/1
5 TABLET ORAL DAILY
Qty: 90 TABLET | Refills: 3 | Status: SHIPPED | OUTPATIENT
Start: 2019-02-26 | End: 2020-04-28

## 2019-02-26 RX ORDER — QUINAPRIL 40 MG/1
40 TABLET ORAL AT BEDTIME
Qty: 90 TABLET | Refills: 3 | Status: SHIPPED | OUTPATIENT
Start: 2019-02-26 | End: 2020-04-28

## 2019-02-26 RX ORDER — HYDROCHLOROTHIAZIDE 25 MG/1
25 TABLET ORAL DAILY
Qty: 90 TABLET | Refills: 1 | Status: SHIPPED | OUTPATIENT
Start: 2019-02-26 | End: 2020-05-07

## 2019-02-26 ASSESSMENT — ENCOUNTER SYMPTOMS
SHORTNESS OF BREATH: 0
PARESTHESIAS: 0
PALPITATIONS: 0
NAUSEA: 0
EYE PAIN: 0
CHILLS: 0
HEMATURIA: 0
DIARRHEA: 0
DIZZINESS: 0
HEADACHES: 0
FREQUENCY: 1
HEARTBURN: 0
WEAKNESS: 0
DYSURIA: 0
SORE THROAT: 0
MYALGIAS: 0
CONSTIPATION: 0
JOINT SWELLING: 0
COUGH: 0
FEVER: 0
ARTHRALGIAS: 0
HEMATOCHEZIA: 0
ABDOMINAL PAIN: 0
NERVOUS/ANXIOUS: 0

## 2019-02-26 ASSESSMENT — MIFFLIN-ST. JEOR: SCORE: 2192.71

## 2019-02-26 NOTE — NURSING NOTE
"Chief Complaint   Patient presents with     Physical       Initial /86   Pulse 99   Temp 98.3  F (36.8  C) (Oral)   Resp 20   Ht 1.867 m (6' 1.5\")   Wt 131.1 kg (289 lb)   SpO2 97%   BMI 37.61 kg/m   Estimated body mass index is 37.61 kg/m  as calculated from the following:    Height as of this encounter: 1.867 m (6' 1.5\").    Weight as of this encounter: 131.1 kg (289 lb).    Wilma Arce, RAMÓN    "

## 2019-02-26 NOTE — PROGRESS NOTES
SUBJECTIVE:   CC: Jeff Brown is an 58 year old male who presents for preventative health visit.   Follow-up htn/obesity.    Would like to change blood pressure med from diovan to accupril. Doesn't like hydrochlorothiazide because of urination. Outside blood pressure. No chest pain or shortness of breath.   Exercise - new job but needs more -taking more stairs.   Non-smoker.     Physical   Annual:     Getting at least 3 servings of Calcium per day:  NO    Bi-annual eye exam:  Yes    Dental care twice a year:  Yes    Sleep apnea or symptoms of sleep apnea:  None    Diet:  Regular (no restrictions)    Frequency of exercise:  1 day/week    Duration of exercise:  Less than 15 minutes    Taking medications regularly:  Yes    Medication side effects:  None    Additional concerns today:  No    PHQ-2 Total Score: 0          PROBLEMS TO ADD ON...    Today's PHQ-2 Score:   PHQ-2 ( 1999 Pfizer) 2/26/2019   Q1: Little interest or pleasure in doing things 0   Q2: Feeling down, depressed or hopeless 0   PHQ-2 Score 0   Q1: Little interest or pleasure in doing things Not at all   Q2: Feeling down, depressed or hopeless Not at all   PHQ-2 Score 0       Abuse: Current or Past(Physical, Sexual or Emotional)- No  Do you feel safe in your environment? Yes    Social History     Tobacco Use     Smoking status: Never Smoker     Smokeless tobacco: Never Used   Substance Use Topics     Alcohol use: No     Alcohol Use 2/26/2019   If you drink alcohol do you typically have greater than 3 drinks per day OR greater than 7 drinks per week? Not Applicable       Last PSA:   PSA   Date Value Ref Range Status   02/15/2018 2.13 0 - 4 ug/L Final     Comment:     Assay Method:  Chemiluminescence using Siemens Vista analyzer       Reviewed orders with patient. Reviewed health maintenance and updated orders accordingly - Yes  Labs reviewed in EPIC    Reviewed and updated as needed this visit by clinical staff         Reviewed and updated as needed  "this visit by Provider            Review of Systems   Constitutional: Negative for chills and fever.   HENT: Negative for congestion, ear pain, hearing loss and sore throat.    Eyes: Negative for pain and visual disturbance.   Respiratory: Negative for cough and shortness of breath.    Cardiovascular: Negative for chest pain, palpitations and peripheral edema.   Gastrointestinal: Negative for abdominal pain, constipation, diarrhea, heartburn, hematochezia and nausea.   Genitourinary: Positive for frequency. Negative for discharge, dysuria, genital sores, hematuria, impotence and urgency.   Musculoskeletal: Negative for arthralgias, joint swelling and myalgias.   Skin: Negative for rash.   Neurological: Negative for dizziness, weakness, headaches and paresthesias.   Psychiatric/Behavioral: Negative for mood changes. The patient is not nervous/anxious.      All other ROS. Frequency with hydrochlorothiazide. No dysuria/heamturia.  emotionally doing ok.     OBJECTIVE:   There were no vitals taken for this visit.  /86   Pulse 99   Temp 98.3  F (36.8  C) (Oral)   Resp 20   Ht 1.867 m (6' 1.5\")   Wt 131.1 kg (289 lb)   SpO2 97%   BMI 37.61 kg/m      Physical Exam  GENERAL: healthy, alert and no distress  EYES: Eyes grossly normal to inspection, PERRL and conjunctivae and sclerae normal  HENT: ear canals and TM's normal, nose and mouth without ulcers or lesions  NECK: no adenopathy, no asymmetry, masses, or scars and thyroid normal to palpation  RESP: lungs clear to auscultation - no rales, rhonchi or wheezes  BREAST: normal without masses, tenderness or nipple discharge and no palpable axillary masses or adenopathy  CV: regular rate and rhythm, normal S1 S2, no S3 or S4, no murmur, click or rub, no peripheral edema and peripheral pulses strong  ABDOMEN: soft, nontender, no hepatosplenomegaly, no masses and bowel sounds normal   (male): patient deferred exam. Denied pain/masses  MS: no gross musculoskeletal " defects noted, no edema  SKIN: no suspicious lesions or rashes  NEURO: Normal strength and tone, mentation intact and speech normal  BACK: no CVA tenderness, no paralumbar tenderness  PSYCH: mentation appears normal, affect normal/bright  LYMPH: no cervical, supraclavicular, axillary, or inguinal adenopathy    Diagnostic Test Results:  none     ASSESSMENT/PLAN:   ASSESSMENT / PLAN:  (Z00.00) Routine history and physical examination of adult  (primary encounter diagnosis)  Comment: generally healthy and normal exam  Plan: Comprehensive metabolic panel (BMP + Alb, Alk         Phos, ALT, AST, Total. Bili, TP), CBC with         platelets        Await labs. Reviewed self mole/testicle check handout.  Call/email with questions/concerns. vitaminD    (I10) Hypertension goal BP (blood pressure) < 140/90  Comment: ok but would like to go back to accupril and make hydrochlorothiazide prn  Plan: quinapril (ACCUPRIL) 40 MG tablet, amLODIPine         (NORVASC) 5 MG tablet, hydrochlorothiazide         (HYDRODIURIL) 25 MG tablet, Comprehensive         metabolic panel (BMP + Alb, Alk Phos, ALT, AST,        Total. Bili, TP)        Reveiwed risks and side effects of medication  Continue self-monitor. Exercise and 10 lbs weight loss. Recheck in 6 months  Sooner if worse. Chest pain or shortness of breath. Call/email with questions/concerns    (Z12.5) Screening PSA (prostate specific antigen)  Comment: ok in past. Patient deferred exam  Plan: PSA, screen            (Z13.6) CARDIOVASCULAR SCREENING; LDL GOAL LESS THAN 130  Comment: ok in past  Plan: Lipid Profile (Chol, Trig, HDL, LDL calc)        Exercise. ?fish oil.         COUNSELING:   Reviewed preventive health counseling, as reflected in patient instructions       Regular exercise       Healthy diet/nutrition       Vision screening       Colon cancer screening       Prostate cancer screening       Osteoporosis Prevention/Bone Health    BP Readings from Last 1 Encounters:   05/21/18  "120/80     Estimated body mass index is 37.09 kg/m  as calculated from the following:    Height as of 5/21/18: 1.867 m (6' 1.5\").    Weight as of 5/21/18: 129.3 kg (285 lb).      Weight management plan: Discussed healthy diet and exercise guidelines     reports that  has never smoked. he has never used smokeless tobacco.      Counseling Resources:  ATP IV Guidelines  Pooled Cohorts Equation Calculator  FRAX Risk Assessment  ICSI Preventive Guidelines  Dietary Guidelines for Americans, 2010  USDA's MyPlate  ASA Prophylaxis  Lung CA Screening    Margarito Casas MD  [facility]  "

## 2019-02-26 NOTE — PROGRESS NOTES
Injectable Influenza Immunization Documentation    1.  Is the person to be vaccinated sick today?   No    2. Does the person to be vaccinated have an allergy to a component   of the vaccine?   No  Egg Allergy Algorithm Link    3. Has the person to be vaccinated ever had a serious reaction   to influenza vaccine in the past?   No    4. Has the person to be vaccinated ever had Guillain-Barré syndrome?   No    Form completed by Wilma Arce CMA         Answers for HPI/ROS submitted by the patient on 2/26/2019   Annual Exam:  Frequency of exercise:: 1 day/week  Getting at least 3 servings of Calcium per day:: NO  Diet:: Regular (no restrictions)  Taking medications regularly:: Yes  Medication side effects:: None  Bi-annual eye exam:: Yes  Dental care twice a year:: Yes  Sleep apnea or symptoms of sleep apnea:: None  Positive for the following: frequency  Negative for the following: abdominal pain, Blood in stool, Blood in urine, chest pain, chills, congestion, constipation, cough, diarrhea, dizziness, ear pain, eye pain, nervous/anxious, fever, genital sores, headaches, hearing loss, heartburn, arthralgias, joint swelling, peripheral edema, mood changes, myalgias, nausea, dysuria, palpitations, Skin sensation changes, sore throat, urgency, rash, shortness of breath, visual disturbance, weakness  impotence: No  penile discharge: No  Additional concerns today:: No  Duration of exercise:: Less than 15 minutes

## 2019-03-02 DIAGNOSIS — I10 HYPERTENSION GOAL BP (BLOOD PRESSURE) < 140/90: ICD-10-CM

## 2019-03-02 DIAGNOSIS — Z00.00 ROUTINE HISTORY AND PHYSICAL EXAMINATION OF ADULT: ICD-10-CM

## 2019-03-02 DIAGNOSIS — Z12.5 SCREENING PSA (PROSTATE SPECIFIC ANTIGEN): ICD-10-CM

## 2019-03-02 DIAGNOSIS — Z13.6 CARDIOVASCULAR SCREENING; LDL GOAL LESS THAN 130: ICD-10-CM

## 2019-03-02 LAB
ERYTHROCYTE [DISTWIDTH] IN BLOOD BY AUTOMATED COUNT: 14.7 % (ref 10–15)
HCT VFR BLD AUTO: 50.9 % (ref 40–53)
HGB BLD-MCNC: 16.8 G/DL (ref 13.3–17.7)
MCH RBC QN AUTO: 28 PG (ref 26.5–33)
MCHC RBC AUTO-ENTMCNC: 33 G/DL (ref 31.5–36.5)
MCV RBC AUTO: 85 FL (ref 78–100)
PLATELET # BLD AUTO: 217 10E9/L (ref 150–450)
RBC # BLD AUTO: 5.99 10E12/L (ref 4.4–5.9)
WBC # BLD AUTO: 9.2 10E9/L (ref 4–11)

## 2019-03-02 PROCEDURE — 80061 LIPID PANEL: CPT | Performed by: FAMILY MEDICINE

## 2019-03-02 PROCEDURE — 36415 COLL VENOUS BLD VENIPUNCTURE: CPT | Performed by: FAMILY MEDICINE

## 2019-03-02 PROCEDURE — 80053 COMPREHEN METABOLIC PANEL: CPT | Performed by: FAMILY MEDICINE

## 2019-03-02 PROCEDURE — 85027 COMPLETE CBC AUTOMATED: CPT | Performed by: FAMILY MEDICINE

## 2019-03-02 PROCEDURE — G0103 PSA SCREENING: HCPCS | Performed by: FAMILY MEDICINE

## 2019-03-04 LAB
ALBUMIN SERPL-MCNC: 3.9 G/DL (ref 3.4–5)
ALP SERPL-CCNC: 73 U/L (ref 40–150)
ALT SERPL W P-5'-P-CCNC: 50 U/L (ref 0–70)
ANION GAP SERPL CALCULATED.3IONS-SCNC: 5 MMOL/L (ref 3–14)
AST SERPL W P-5'-P-CCNC: 29 U/L (ref 0–45)
BILIRUB SERPL-MCNC: 0.4 MG/DL (ref 0.2–1.3)
BUN SERPL-MCNC: 13 MG/DL (ref 7–30)
CALCIUM SERPL-MCNC: 9.9 MG/DL (ref 8.5–10.1)
CHLORIDE SERPL-SCNC: 110 MMOL/L (ref 94–109)
CHOLEST SERPL-MCNC: 190 MG/DL
CO2 SERPL-SCNC: 26 MMOL/L (ref 20–32)
CREAT SERPL-MCNC: 1.02 MG/DL (ref 0.66–1.25)
GFR SERPL CREATININE-BSD FRML MDRD: 81 ML/MIN/{1.73_M2}
GLUCOSE SERPL-MCNC: 97 MG/DL (ref 70–99)
HDLC SERPL-MCNC: 39 MG/DL
LDLC SERPL CALC-MCNC: 118 MG/DL
NONHDLC SERPL-MCNC: 151 MG/DL
POTASSIUM SERPL-SCNC: 4 MMOL/L (ref 3.4–5.3)
PROT SERPL-MCNC: 7.3 G/DL (ref 6.8–8.8)
PSA SERPL-ACNC: 1.3 UG/L (ref 0–4)
SODIUM SERPL-SCNC: 141 MMOL/L (ref 133–144)
TRIGL SERPL-MCNC: 163 MG/DL

## 2020-02-23 ENCOUNTER — HEALTH MAINTENANCE LETTER (OUTPATIENT)
Age: 60
End: 2020-02-23

## 2020-03-03 ENCOUNTER — DOCUMENTATION ONLY (OUTPATIENT)
Dept: FAMILY MEDICINE | Facility: CLINIC | Age: 60
End: 2020-03-03

## 2020-03-03 DIAGNOSIS — I10 HYPERTENSION GOAL BP (BLOOD PRESSURE) < 140/90: Primary | ICD-10-CM

## 2020-03-03 DIAGNOSIS — Z00.00 ROUTINE HISTORY AND PHYSICAL EXAMINATION OF ADULT: ICD-10-CM

## 2020-03-03 DIAGNOSIS — Z12.5 SCREENING FOR PROSTATE CANCER: ICD-10-CM

## 2020-03-03 DIAGNOSIS — Z13.6 CARDIOVASCULAR SCREENING; LDL GOAL LESS THAN 130: ICD-10-CM

## 2020-03-03 NOTE — PROGRESS NOTES
.Please place or confirm pending lab orders for upcoming lab appointment on 3/4/20  Thank you,  Mally

## 2020-03-03 NOTE — PROGRESS NOTES
Please review lab orders. Sign and close encounter. Pamela Woods TC/Pt Rep    Physical: 3/19/2020

## 2020-03-04 DIAGNOSIS — Z12.5 SCREENING FOR PROSTATE CANCER: ICD-10-CM

## 2020-03-04 DIAGNOSIS — Z00.00 ROUTINE HISTORY AND PHYSICAL EXAMINATION OF ADULT: ICD-10-CM

## 2020-03-04 DIAGNOSIS — Z13.6 CARDIOVASCULAR SCREENING; LDL GOAL LESS THAN 130: ICD-10-CM

## 2020-03-04 DIAGNOSIS — I10 HYPERTENSION GOAL BP (BLOOD PRESSURE) < 140/90: ICD-10-CM

## 2020-03-04 LAB
ALBUMIN SERPL-MCNC: 3.9 G/DL (ref 3.4–5)
ALP SERPL-CCNC: 79 U/L (ref 40–150)
ALT SERPL W P-5'-P-CCNC: 41 U/L (ref 0–70)
ANION GAP SERPL CALCULATED.3IONS-SCNC: 5 MMOL/L (ref 3–14)
AST SERPL W P-5'-P-CCNC: 18 U/L (ref 0–45)
BILIRUB SERPL-MCNC: 0.6 MG/DL (ref 0.2–1.3)
BUN SERPL-MCNC: 13 MG/DL (ref 7–30)
CALCIUM SERPL-MCNC: 9.9 MG/DL (ref 8.5–10.1)
CHLORIDE SERPL-SCNC: 106 MMOL/L (ref 94–109)
CHOLEST SERPL-MCNC: 187 MG/DL
CO2 SERPL-SCNC: 27 MMOL/L (ref 20–32)
CREAT SERPL-MCNC: 0.99 MG/DL (ref 0.66–1.25)
ERYTHROCYTE [DISTWIDTH] IN BLOOD BY AUTOMATED COUNT: 14.3 % (ref 10–15)
GFR SERPL CREATININE-BSD FRML MDRD: 83 ML/MIN/{1.73_M2}
GLUCOSE SERPL-MCNC: 99 MG/DL (ref 70–99)
HCT VFR BLD AUTO: 51.3 % (ref 40–53)
HDLC SERPL-MCNC: 41 MG/DL
HGB BLD-MCNC: 16.9 G/DL (ref 13.3–17.7)
LDLC SERPL CALC-MCNC: 124 MG/DL
MCH RBC QN AUTO: 27.6 PG (ref 26.5–33)
MCHC RBC AUTO-ENTMCNC: 32.9 G/DL (ref 31.5–36.5)
MCV RBC AUTO: 84 FL (ref 78–100)
NONHDLC SERPL-MCNC: 146 MG/DL
PLATELET # BLD AUTO: 253 10E9/L (ref 150–450)
POTASSIUM SERPL-SCNC: 4.4 MMOL/L (ref 3.4–5.3)
PROT SERPL-MCNC: 8.2 G/DL (ref 6.8–8.8)
PSA SERPL-ACNC: 1.58 UG/L (ref 0–4)
RBC # BLD AUTO: 6.12 10E12/L (ref 4.4–5.9)
SODIUM SERPL-SCNC: 138 MMOL/L (ref 133–144)
TRIGL SERPL-MCNC: 110 MG/DL
WBC # BLD AUTO: 11 10E9/L (ref 4–11)

## 2020-03-04 PROCEDURE — 85027 COMPLETE CBC AUTOMATED: CPT | Performed by: FAMILY MEDICINE

## 2020-03-04 PROCEDURE — 80061 LIPID PANEL: CPT | Performed by: FAMILY MEDICINE

## 2020-03-04 PROCEDURE — 36415 COLL VENOUS BLD VENIPUNCTURE: CPT | Performed by: FAMILY MEDICINE

## 2020-03-04 PROCEDURE — 80053 COMPREHEN METABOLIC PANEL: CPT | Performed by: FAMILY MEDICINE

## 2020-03-04 PROCEDURE — G0103 PSA SCREENING: HCPCS | Performed by: FAMILY MEDICINE

## 2020-03-04 NOTE — PROGRESS NOTES
...Your patient was in for lab test today and there are pending orders in Epic. I drew JIC tubes.  Please review and tag any additional orders to the lab appointment or enter orders as a future and I will watch for them.  Thank you   Yessi   @ [facility]

## 2020-04-27 DIAGNOSIS — I10 HYPERTENSION GOAL BP (BLOOD PRESSURE) < 140/90: ICD-10-CM

## 2020-04-27 NOTE — LETTER
April 29, 2020    Max K Enevoldsen  [address]  [place] [address]    Dear Jeff,       We recently received a refill request for quinapril and amlodipine.  We have refilled this for a one time 30 day supply only because you are due for a:    Blood pressure/medication check office visit      Please call at your earliest convenience so that there will not be a delay with your future refills.          Thank you,   Your [facility] Team/  [phone]

## 2020-04-28 RX ORDER — AMLODIPINE BESYLATE 5 MG/1
TABLET ORAL
Qty: 90 TABLET | Refills: 0 | Status: SHIPPED | OUTPATIENT
Start: 2020-04-28 | End: 2020-05-07

## 2020-04-28 RX ORDER — QUINAPRIL 40 MG/1
TABLET ORAL
Qty: 30 TABLET | Refills: 0 | Status: SHIPPED | OUTPATIENT
Start: 2020-04-28 | End: 2020-05-07

## 2020-04-28 NOTE — TELEPHONE ENCOUNTER
Routing refill request to provider for review/approval because:    Patient failed:  Blood pressure under 140/90 in past 12 months    BP Readings from Last 3 Encounters:   02/26/19 130/86   05/21/18 120/80   02/19/18 122/82     Crayl Irving BSN, RN

## 2020-04-28 NOTE — TELEPHONE ENCOUNTER
Patient need md appointment and we can do labs at that time in 3-4 weeks. Please help set-up. thanks. Margarito Casas MD

## 2020-05-05 NOTE — PROGRESS NOTES
"Jeff Brown is a 59 year old male who is being evaluated via a billable video visit.    Follow-up htn/obesity. Outside blood pressure readings stable. Had normal labs 2months ago - LOS=643 and cr/potassium ok. Normal psa. Exercise more with warmer weather - more walking. 1-2.5 miles/day. No chest pain or shortness of breath. No nausea, vomiting or diarrhea. No urine changes or hematuria. Emotionally doing ok.   The patient has been notified of following:     \"This video visit will be conducted via a call between you and your physician/provider. We have found that certain health care needs can be provided without the need for an in-person physical exam.  This service lets us provide the care you need with a video conversation.  If a prescription is necessary we can send it directly to your pharmacy.  If lab work is needed we can place an order for that and you can then stop by our lab to have the test done at a later time.    Video visits are billed at different rates depending on your insurance coverage.  Please reach out to your insurance provider with any questions.    If during the course of the call the physician/provider feels a video visit is not appropriate, you will not be charged for this service.\"    Patient has given verbal consent for Video visit? Yes    How would you like to obtain your AVS? Mail a copy    Patient would like the video invitation sent by: Text to cell phone: [phone]    Will anyone else be joining your video visit? No    Subjective     Jeff Brown is a 59 year old male who presents to clinic today for the following health issues:    HPI    Med check, Amlodipine, Quinapril and hydrochlorothiazide.    PROBLEMS TO ADD ON...    Patient Active Problem List   Diagnosis     CARDIOVASCULAR SCREENING; LDL GOAL LESS THAN 130     Hypertension goal BP (blood pressure) < 140/90     BMI 36.0-36.9,adult     Seasonal allergic rhinitis     Advanced directives, counseling/discussion     Past " "Surgical History:   Procedure Laterality Date     COLONOSCOPY  2005     ENT SURGERY      tonsils       Social History     Tobacco Use     Smoking status: Never Smoker     Smokeless tobacco: Never Used   Substance Use Topics     Alcohol use: No     Family History   Problem Relation Age of Onset     Other Cancer Mother         Skin cancer.  Removed last year.     Thyroid Disease Mother      Heart Disease Father 65        htn     Hypertension Father         1st heart attack late 50s.  2nd mid-60s.           Reviewed and updated as needed this visit by Provider         Review of Systems   All other ROS negative.       Objective    There were no vitals taken for this visit.  Estimated body mass index is 37.61 kg/m  as calculated from the following:    Height as of 2/26/19: 1.867 m (6' 1.5\").    Weight as of 2/26/19: 131.1 kg (289 lb).  Physical Exam     GENERAL: healthy, alert and no distress  PSYCH: mentation appears normal, affect normal/bright, judgement and insight intact, normal speech}        ASSESSMENT / PLAN:  (I10) Hypertension goal BP (blood pressure) < 140/90  Comment: stable  Plan: amLODIPine (NORVASC) 5 MG tablet, quinapril         (ACCUPRIL) 40 MG tablet, hydrochlorothiazide         (HYDRODIURIL) 25 MG tablet        Continue self-monitor/limit sodium and exercise. Chest pain or shortness of breath to er. Recheck in 6 months  In clinic - sooner if worse/new issues. Call/email with questions/concerns. Reveiwed risks and side effects of medication                Type of service:  telephone 12 minutes-  Unable to get video to work    No follow-ups on file.       Margarito Casas MD        "

## 2020-05-07 ENCOUNTER — VIRTUAL VISIT (OUTPATIENT)
Dept: FAMILY MEDICINE | Facility: CLINIC | Age: 60
End: 2020-05-07
Payer: OTHER GOVERNMENT

## 2020-05-07 DIAGNOSIS — I10 HYPERTENSION GOAL BP (BLOOD PRESSURE) < 140/90: ICD-10-CM

## 2020-05-07 PROCEDURE — 99213 OFFICE O/P EST LOW 20 MIN: CPT | Mod: 95 | Performed by: FAMILY MEDICINE

## 2020-05-07 RX ORDER — QUINAPRIL 40 MG/1
TABLET ORAL
Qty: 90 TABLET | Refills: 2 | Status: SHIPPED | OUTPATIENT
Start: 2020-05-07 | End: 2021-01-28

## 2020-05-07 RX ORDER — HYDROCHLOROTHIAZIDE 25 MG/1
25 TABLET ORAL DAILY
Qty: 90 TABLET | Refills: 2 | Status: SHIPPED | OUTPATIENT
Start: 2020-05-07 | End: 2021-01-28

## 2020-05-07 RX ORDER — AMLODIPINE BESYLATE 5 MG/1
5 TABLET ORAL DAILY
Qty: 90 TABLET | Refills: 2 | Status: SHIPPED | OUTPATIENT
Start: 2020-05-07 | End: 2021-01-28

## 2020-12-12 ENCOUNTER — HEALTH MAINTENANCE LETTER (OUTPATIENT)
Age: 60
End: 2020-12-12

## 2021-01-25 ASSESSMENT — ENCOUNTER SYMPTOMS
NERVOUS/ANXIOUS: 0
DIARRHEA: 0
MYALGIAS: 0
ABDOMINAL PAIN: 0
ARTHRALGIAS: 0
COUGH: 0
HEMATURIA: 0
NAUSEA: 0
DIZZINESS: 0
PARESTHESIAS: 0
JOINT SWELLING: 0
CHILLS: 0
WEAKNESS: 0
HEARTBURN: 0
SHORTNESS OF BREATH: 0
CONSTIPATION: 0
PALPITATIONS: 0
HEADACHES: 0
FEVER: 0
SORE THROAT: 0
EYE PAIN: 0
HEMATOCHEZIA: 0
DYSURIA: 0
FREQUENCY: 0

## 2021-01-27 NOTE — PROGRESS NOTES
"  3  SUBJECTIVE:   CC: Jeff Brown is an 60 year old male who presents for preventive health visit.     {Split Bill scripting  The purpose of this visit is to discuss your medical history and prevent health problems before you are sick. You may be responsible for a co-pay, coinsurance, or deductible if your visit today includes services such as checking on a sore throat, having an x-ray or lab test, or treating and evaluating a new or existing condition :738105}  Patient has been advised of split billing requirements and indicates understanding: {Yes and No:223835}  Healthy Habits:    Do you get at least three servings of calcium containing foods daily (dairy, green leafy vegetables, etc.)? { :502224::\"yes\"}    Amount of exercise or daily activities, outside of work: { :407916}    Problems taking medications regularly { :701442::\"No\"}    Medication side effects: { :017449::\"No\"}    Have you had an eye exam in the past two years? { :264563}    Do you see a dentist twice per year? { :787817}    Do you have sleep apnea, excessive snoring or daytime drowsiness?{ :661985}  {Outside tests to abstract? :081734}    {additional problems to add (Optional):644819}    Today's PHQ-2 Score:   PHQ-2 ( 1999 Pfizer) 1/25/2021 5/7/2020   Q1: Little interest or pleasure in doing things 0 0   Q2: Feeling down, depressed or hopeless 0 0   PHQ-2 Score 0 0   Q1: Little interest or pleasure in doing things Not at all -   Q2: Feeling down, depressed or hopeless Not at all -   PHQ-2 Score 0 -     {PHQ-2 LOOK IN ASSESSMENTS (Optional) :839203}  Abuse: Current or Past(Physical, Sexual or Emotional)- {YES/NO/NA:090414}  Do you feel safe in your environment? {YES/NO/NA:507579}    Have you ever done Advance Care Planning? (For example, a Health Directive, POLST, or a discussion with a medical provider or your loved ones about your wishes): { :423858}    Social History     Tobacco Use     Smoking status: Never Smoker     Smokeless tobacco: " "Never Used   Substance Use Topics     Alcohol use: No     If you drink alcohol do you typically have >3 drinks per day or >7 drinks per week? {ETOH :480704}                      Last PSA:   PSA   Date Value Ref Range Status   03/04/2020 1.58 0 - 4 ug/L Final     Comment:     Assay Method:  Chemiluminescence using Siemens Vista analyzer       Reviewed orders with patient. Reviewed health maintenance and updated orders accordingly - {Yes/No:590862::\"Yes\"}  {Chronicprobdata (Optional):384847}    Reviewed and updated as needed this visit by clinical staff                 Reviewed and updated as needed this visit by Provider                {HISTORY OPTIONS (Optional):194605}    ROS:  { :527745::\"CONSTITUTIONAL: NEGATIVE for fever, chills, change in weight\",\"INTEGUMENTARY/SKIN: NEGATIVE for worrisome rashes, moles or lesions\",\"EYES: NEGATIVE for vision changes or irritation\",\"ENT: NEGATIVE for ear, mouth and throat problems\",\"RESP: NEGATIVE for significant cough or SOB\",\"CV: NEGATIVE for chest pain, palpitations or peripheral edema\",\"GI: NEGATIVE for nausea, abdominal pain, heartburn, or change in bowel habits\",\" male: negative for dysuria, hematuria, decreased urinary stream, erectile dysfunction, urethral discharge\",\"MUSCULOSKELETAL: NEGATIVE for significant arthralgias or myalgia\",\"NEURO: NEGATIVE for weakness, dizziness or paresthesias\",\"PSYCHIATRIC: NEGATIVE for changes in mood or affect\"}    OBJECTIVE:   There were no vitals taken for this visit.  EXAM:  {Exam Choices:774734}    {Diagnostic Test Results (Optional):014675::\"Diagnostic Test Results:\",\"Labs reviewed in Epic\"}    ASSESSMENT/PLAN:   {Diag Picklist:037744}    Patient has been advised of split billing requirements and indicates understanding: {YES / NO:029932::\"Yes\"}  COUNSELING:  {MALE COUNSELING MESSAGES:351804::\"Reviewed preventive health counseling, as reflected in patient instructions\"}    Estimated body mass index is 37.61 kg/m  as calculated from " "the following:    Height as of 2/26/19: 1.867 m (6' 1.5\").    Weight as of 2/26/19: 131.1 kg (289 lb).    {Weight Management Plan (ACO) Complete if BMI is abnormal-  Ages 18-64  BMI >24.9.  Age 65+ with BMI <23 or >30 (Optional):893987}    He reports that he has never smoked. He has never used smokeless tobacco.      Counseling Resources:  ATP IV Guidelines  Pooled Cohorts Equation Calculator  FRAX Risk Assessment  ICSI Preventive Guidelines  Dietary Guidelines for Americans, 2010  USDA's MyPlate  ASA Prophylaxis  Lung CA Screening    Margarito Casas MD  [facility]  "

## 2021-01-28 ENCOUNTER — OFFICE VISIT (OUTPATIENT)
Dept: FAMILY MEDICINE | Facility: CLINIC | Age: 61
End: 2021-01-28
Payer: OTHER GOVERNMENT

## 2021-01-28 VITALS
OXYGEN SATURATION: 96 % | BODY MASS INDEX: 38.04 KG/M2 | TEMPERATURE: 98.2 F | WEIGHT: 287 LBS | SYSTOLIC BLOOD PRESSURE: 136 MMHG | HEIGHT: 73 IN | HEART RATE: 101 BPM | DIASTOLIC BLOOD PRESSURE: 85 MMHG

## 2021-01-28 DIAGNOSIS — E66.01 MORBID OBESITY (H): ICD-10-CM

## 2021-01-28 DIAGNOSIS — Z12.5 SCREENING PSA (PROSTATE SPECIFIC ANTIGEN): ICD-10-CM

## 2021-01-28 DIAGNOSIS — Z12.11 COLON CANCER SCREENING: ICD-10-CM

## 2021-01-28 DIAGNOSIS — G89.29 CHRONIC PAIN OF LEFT KNEE: ICD-10-CM

## 2021-01-28 DIAGNOSIS — Z13.6 CARDIOVASCULAR SCREENING; LDL GOAL LESS THAN 130: ICD-10-CM

## 2021-01-28 DIAGNOSIS — I10 HYPERTENSION GOAL BP (BLOOD PRESSURE) < 140/90: ICD-10-CM

## 2021-01-28 DIAGNOSIS — Z00.00 ROUTINE GENERAL MEDICAL EXAMINATION AT A HEALTH CARE FACILITY: Primary | ICD-10-CM

## 2021-01-28 DIAGNOSIS — M25.562 CHRONIC PAIN OF LEFT KNEE: ICD-10-CM

## 2021-01-28 PROCEDURE — 99396 PREV VISIT EST AGE 40-64: CPT | Performed by: FAMILY MEDICINE

## 2021-01-28 PROCEDURE — 99213 OFFICE O/P EST LOW 20 MIN: CPT | Mod: 25 | Performed by: FAMILY MEDICINE

## 2021-01-28 RX ORDER — QUINAPRIL 40 MG/1
TABLET ORAL
Qty: 90 TABLET | Refills: 1 | Status: SHIPPED | OUTPATIENT
Start: 2021-01-28 | End: 2022-02-07

## 2021-01-28 RX ORDER — HYDROCHLOROTHIAZIDE 25 MG/1
25 TABLET ORAL DAILY
Qty: 90 TABLET | Refills: 1 | Status: SHIPPED | OUTPATIENT
Start: 2021-01-28 | End: 2022-02-07

## 2021-01-28 RX ORDER — AMLODIPINE BESYLATE 5 MG/1
5 TABLET ORAL DAILY
Qty: 90 TABLET | Refills: 1 | Status: SHIPPED | OUTPATIENT
Start: 2021-01-28 | End: 2021-10-14

## 2021-01-28 ASSESSMENT — ENCOUNTER SYMPTOMS
PARESTHESIAS: 0
HEMATURIA: 0
CONSTIPATION: 0
DYSURIA: 0
SORE THROAT: 0
DIZZINESS: 0
NERVOUS/ANXIOUS: 0
WEAKNESS: 0
JOINT SWELLING: 0
FEVER: 0
DIARRHEA: 0
EYE PAIN: 0
PALPITATIONS: 0
FREQUENCY: 0
CHILLS: 0
ARTHRALGIAS: 0
COUGH: 0
HEADACHES: 0
HEMATOCHEZIA: 0
SHORTNESS OF BREATH: 0
NAUSEA: 0
HEARTBURN: 0
ABDOMINAL PAIN: 0
MYALGIAS: 0

## 2021-01-28 ASSESSMENT — MIFFLIN-ST. JEOR: SCORE: 2165.7

## 2021-01-28 NOTE — PROGRESS NOTES
SUBJECTIVE:   CC: Jeff Brown is an 60 year old male who presents for preventative health visit.   Follow-up htn/obesity  Outside blood pressure readings average 120/80. Exercise limited needs more. Will walk more with warmer weather.   No chest pain. No shortness of breath.   No nausea, vomiting or diarrhea or black/bloody stools.   No urine changes or hematuria. No rashes/mole changes.   No abdominal pain.   No nocturia. No testicle pain/masses  left knee pain with certain positions <1 year. No injury recently but twisted a couple years ago. No swelling/locking.   Patient has been advised of split billing requirements and indicates understanding: Yes  Healthy Habits:     Getting at least 3 servings of Calcium per day:  NO    Bi-annual eye exam:  Yes    Dental care twice a year:  Yes    Sleep apnea or symptoms of sleep apnea:  Daytime drowsiness    Diet:  Regular (no restrictions)    Frequency of exercise:  None    Taking medications regularly:  Yes    Medication side effects:  None    PHQ-2 Total Score: 0    Additional concerns today:  Yes      Today's PHQ-2 Score:   PHQ-2 ( 1999 Pfizer) 1/25/2021   Q1: Little interest or pleasure in doing things 0   Q2: Feeling down, depressed or hopeless 0   PHQ-2 Score 0   Q1: Little interest or pleasure in doing things Not at all   Q2: Feeling down, depressed or hopeless Not at all   PHQ-2 Score 0       Abuse: Current or Past(Physical, Sexual or Emotional)- No  Do you feel safe in your environment? Yes        Social History     Tobacco Use     Smoking status: Never Smoker     Smokeless tobacco: Never Used   Substance Use Topics     Alcohol use: No     If you drink alcohol do you typically have >3 drinks per day or >7 drinks per week? Not applicable    Alcohol Use 1/25/2021   Prescreen: >3 drinks/day or >7 drinks/week? Not Applicable   Prescreen: >3 drinks/day or >7 drinks/week? -       Last PSA:   PSA   Date Value Ref Range Status   03/04/2020 1.58 0 - 4 ug/L Final      "Comment:     Assay Method:  Chemiluminescence using Siemens Vista analyzer       Reviewed orders with patient. Reviewed health maintenance and updated orders accordingly - Yes  Lab work is in process    Reviewed and updated as needed this visit by clinical staff                 Reviewed and updated as needed this visit by Provider                    Review of Systems   Constitutional: Negative for chills and fever.   HENT: Negative for congestion, ear pain, hearing loss and sore throat.    Eyes: Negative for pain and visual disturbance.   Respiratory: Negative for cough and shortness of breath.    Cardiovascular: Negative for chest pain, palpitations and peripheral edema.   Gastrointestinal: Negative for abdominal pain, constipation, diarrhea, heartburn, hematochezia and nausea.   Genitourinary: Negative for discharge, dysuria, frequency, genital sores, hematuria, impotence and urgency.   Musculoskeletal: Negative for arthralgias, joint swelling and myalgias.   Skin: Negative for rash.   Neurological: Negative for dizziness, weakness, headaches and paresthesias.   Psychiatric/Behavioral: Negative for mood changes. The patient is not nervous/anxious.      All other ROS negative.     OBJECTIVE:   /85   Pulse 101   Temp 98.2  F (36.8  C) (Tympanic)   Ht 1.854 m (6' 1\")   Wt 130.2 kg (287 lb)   SpO2 96%   BMI 37.87 kg/m       Physical Exam  GENERAL: healthy, alert and no distress  EYES: Eyes grossly normal to inspection, PERRL and conjunctivae and sclerae normal  HENT: ear canals and TM's normal, nose and mouth without ulcers or lesions  NECK: no adenopathy, no asymmetry, masses, or scars and thyroid normal to palpation  RESP: lungs clear to auscultation - no rales, rhonchi or wheezes  BREAST: normal without masses, tenderness or nipple discharge and no palpable axillary masses or adenopathy  CV: regular rate and rhythm, normal S1 S2, no S3 or S4, no murmur, click or rub, no peripheral edema and peripheral " pulses strong  ABDOMEN: soft, nontender, no hepatosplenomegaly, no masses and bowel sounds normal   (male): patient deferred /rectal exams. Denies pain/masses or hernia  MS: no gross musculoskeletal defects noted, no edema  SKIN: no suspicious lesions or rashes  NEURO: Normal strength and tone, mentation intact and speech normal  BACK: no CVA tenderness, no paralumbar tenderness  PSYCH: mentation appears normal, affect normal/bright  LYMPH: no cervical, supraclavicular, axillary, or inguinal adenopathy      ASSESSMENT/PLAN:   ASSESSMENT / PLAN:  (Z00.00) Routine general medical examination at a health care facility  (primary encounter diagnosis)  Comment: generally healthy and normal exam  Plan: CBC with platelets, Comprehensive metabolic         panel        Future labs. Reviewed self mole/testicle check handout.  Yearly eye exam/ dentists and MVI daily    (I10) Hypertension goal BP (blood pressure) < 140/90  Comment: stable  Plan: Comprehensive metabolic panel, quinapril         (ACCUPRIL) 40 MG tablet, hydrochlorothiazide         (HYDRODIURIL) 25 MG tablet, amLODIPine         (NORVASC) 5 MG tablet        Continue meds and exercise/ weight loss. Chest pain or shortness of breath to er. Continue self-monitor and weight loss.     (Z68.36) BMI 36.0-36.9,adult  Plan: diet/exericse. Recheck in 6 months      (Z13.6) CARDIOVASCULAR SCREENING; LDL GOAL LESS THAN 130  Comment: ok in past  Plan: Lipid panel reflex to direct LDL Fasting        Weight loss    (Z12.11) Colon cancer screening  Comment: due in spring  Plan: GASTROENTEROLOGY ADULT REF PROCEDURE ONLY        [facility] did in past    (Z12.5) Screening PSA (prostate specific antigen)  Plan: Prostate spec antigen screen              (M25.562,  G89.29) Chronic pain of left knee  Comment: strain likely  (vs meniscal tear?)  Plan: brace/tylenol and stretching. Follow-up ortho if worse. Expected course and warning signs reviewed. Call/email with questions/concerns  "        Patient has been advised of split billing requirements and indicates understanding: Yes  COUNSELING:   Reviewed preventive health counseling, as reflected in patient instructions       Regular exercise       Healthy diet/nutrition       Vision screening       Colon cancer screening       Prostate cancer screening       Osteoporosis prevention/bone health    Estimated body mass index is 37.61 kg/m  as calculated from the following:    Height as of 2/26/19: 1.867 m (6' 1.5\").    Weight as of 2/26/19: 131.1 kg (289 lb).     Weight management plan: Discussed healthy diet and exercise guidelines    He reports that he has never smoked. He has never used smokeless tobacco.      Counseling Resources:  ATP IV Guidelines  Pooled Cohorts Equation Calculator  FRAX Risk Assessment  ICSI Preventive Guidelines  Dietary Guidelines for Americans, 2010  USDA's MyPlate  ASA Prophylaxis  Lung CA Screening    Margarito Casas MD  [facility]  "

## 2021-03-05 DIAGNOSIS — Z12.5 SCREENING PSA (PROSTATE SPECIFIC ANTIGEN): ICD-10-CM

## 2021-03-05 DIAGNOSIS — Z13.6 CARDIOVASCULAR SCREENING; LDL GOAL LESS THAN 130: ICD-10-CM

## 2021-03-05 DIAGNOSIS — I10 HYPERTENSION GOAL BP (BLOOD PRESSURE) < 140/90: ICD-10-CM

## 2021-03-05 DIAGNOSIS — Z00.00 ROUTINE GENERAL MEDICAL EXAMINATION AT A HEALTH CARE FACILITY: ICD-10-CM

## 2021-03-05 LAB
ALBUMIN SERPL-MCNC: 3.8 G/DL (ref 3.4–5)
ALP SERPL-CCNC: 62 U/L (ref 40–150)
ALT SERPL W P-5'-P-CCNC: 53 U/L (ref 0–70)
ANION GAP SERPL CALCULATED.3IONS-SCNC: 4 MMOL/L (ref 3–14)
AST SERPL W P-5'-P-CCNC: 20 U/L (ref 0–45)
BILIRUB SERPL-MCNC: 0.7 MG/DL (ref 0.2–1.3)
BUN SERPL-MCNC: 18 MG/DL (ref 7–30)
CALCIUM SERPL-MCNC: 10 MG/DL (ref 8.5–10.1)
CHLORIDE SERPL-SCNC: 105 MMOL/L (ref 94–109)
CHOLEST SERPL-MCNC: 160 MG/DL
CO2 SERPL-SCNC: 28 MMOL/L (ref 20–32)
CREAT SERPL-MCNC: 1.05 MG/DL (ref 0.66–1.25)
ERYTHROCYTE [DISTWIDTH] IN BLOOD BY AUTOMATED COUNT: 14.1 % (ref 10–15)
GFR SERPL CREATININE-BSD FRML MDRD: 77 ML/MIN/{1.73_M2}
GLUCOSE SERPL-MCNC: 95 MG/DL (ref 70–99)
HCT VFR BLD AUTO: 49 % (ref 40–53)
HDLC SERPL-MCNC: 39 MG/DL
HGB BLD-MCNC: 16.1 G/DL (ref 13.3–17.7)
LDLC SERPL CALC-MCNC: 96 MG/DL
MCH RBC QN AUTO: 27.8 PG (ref 26.5–33)
MCHC RBC AUTO-ENTMCNC: 32.9 G/DL (ref 31.5–36.5)
MCV RBC AUTO: 85 FL (ref 78–100)
NONHDLC SERPL-MCNC: 121 MG/DL
PLATELET # BLD AUTO: 244 10E9/L (ref 150–450)
POTASSIUM SERPL-SCNC: 4.1 MMOL/L (ref 3.4–5.3)
PROT SERPL-MCNC: 7.7 G/DL (ref 6.8–8.8)
PSA SERPL-ACNC: 1.74 UG/L (ref 0–4)
RBC # BLD AUTO: 5.8 10E12/L (ref 4.4–5.9)
SODIUM SERPL-SCNC: 137 MMOL/L (ref 133–144)
TRIGL SERPL-MCNC: 123 MG/DL
WBC # BLD AUTO: 11 10E9/L (ref 4–11)

## 2021-03-05 PROCEDURE — 80053 COMPREHEN METABOLIC PANEL: CPT | Performed by: FAMILY MEDICINE

## 2021-03-05 PROCEDURE — 36415 COLL VENOUS BLD VENIPUNCTURE: CPT | Performed by: FAMILY MEDICINE

## 2021-03-05 PROCEDURE — 85027 COMPLETE CBC AUTOMATED: CPT | Performed by: FAMILY MEDICINE

## 2021-03-05 PROCEDURE — 80061 LIPID PANEL: CPT | Performed by: FAMILY MEDICINE

## 2021-03-05 PROCEDURE — G0103 PSA SCREENING: HCPCS | Performed by: FAMILY MEDICINE

## 2021-03-29 ENCOUNTER — IMMUNIZATION (OUTPATIENT)
Dept: FAMILY MEDICINE | Facility: CLINIC | Age: 61
End: 2021-03-29
Payer: OTHER GOVERNMENT

## 2021-03-29 PROCEDURE — 0011A PR COVID VAC MODERNA 100 MCG/0.5 ML IM: CPT

## 2021-03-29 PROCEDURE — 91301 PR COVID VAC MODERNA 100 MCG/0.5 ML IM: CPT

## 2021-04-26 ENCOUNTER — IMMUNIZATION (OUTPATIENT)
Dept: FAMILY MEDICINE | Facility: CLINIC | Age: 61
End: 2021-04-26
Attending: FAMILY MEDICINE
Payer: OTHER GOVERNMENT

## 2021-04-26 PROCEDURE — 0012A PR COVID VAC MODERNA 100 MCG/0.5 ML IM: CPT

## 2021-04-26 PROCEDURE — 91301 PR COVID VAC MODERNA 100 MCG/0.5 ML IM: CPT

## 2021-05-12 ENCOUNTER — TRANSFERRED RECORDS (OUTPATIENT)
Dept: HEALTH INFORMATION MANAGEMENT | Facility: CLINIC | Age: 61
End: 2021-05-12

## 2021-09-14 ENCOUNTER — HOSPITAL ENCOUNTER (EMERGENCY)
Facility: CLINIC | Age: 61
Discharge: HOME | End: 2021-09-14
Attending: EMERGENCY MEDICINE | Admitting: EMERGENCY MEDICINE
Payer: OTHER GOVERNMENT

## 2021-09-14 ENCOUNTER — NURSE TRIAGE (OUTPATIENT)
Dept: NURSING | Facility: CLINIC | Age: 61
End: 2021-09-14

## 2021-09-14 VITALS
HEART RATE: 88 BPM | BODY MASS INDEX: 36.81 KG/M2 | DIASTOLIC BLOOD PRESSURE: 97 MMHG | WEIGHT: 279 LBS | SYSTOLIC BLOOD PRESSURE: 133 MMHG | RESPIRATION RATE: 20 BRPM | TEMPERATURE: 98.3 F | OXYGEN SATURATION: 97 %

## 2021-09-14 DIAGNOSIS — M79.2 NERVE PAIN: ICD-10-CM

## 2021-09-14 PROCEDURE — 99283 EMERGENCY DEPT VISIT LOW MDM: CPT | Performed by: EMERGENCY MEDICINE

## 2021-09-14 PROCEDURE — 93010 ELECTROCARDIOGRAM REPORT: CPT | Performed by: EMERGENCY MEDICINE

## 2021-09-14 PROCEDURE — 93005 ELECTROCARDIOGRAM TRACING: CPT | Performed by: EMERGENCY MEDICINE

## 2021-09-14 PROCEDURE — 99284 EMERGENCY DEPT VISIT MOD MDM: CPT | Mod: 25 | Performed by: EMERGENCY MEDICINE

## 2021-09-14 RX ORDER — POLYDEXTROSE 1.5 G
TABLET,CHEWABLE ORAL
COMMUNITY

## 2021-09-14 NOTE — TELEPHONE ENCOUNTER
Triage call:   Right arm tingling and not felt before  First noted today when he got up at 3 am but went away- thought he had been laying on his arm too long, was able to fall back to sleep after sensation went away  Up around 7 am and the tingling came back- has been there since then- has not resolved at all  Right bicep on the inside he has a spot where something feels off- states like a sore spot almost    Reports that the intensity of the tingling will vary but is always there  Denies additional neurological or cardiac symptoms    Advised that based on his symptoms that patient should call 911 to be evaluated. Patient agrees to call 911 as soon as call is ended.     Brenda Brooks RN BSN 9/14/2021 9:21 AM    COVID 19 Nurse Triage Plan/Patient Instructions    Please be aware that novel coronavirus (COVID-19) may be circulating in the community. If you develop symptoms such as fever, cough, or SOB or if you have concerns about the presence of another infection including coronavirus (COVID-19), please contact your health care provider or visit [email or web address].     Disposition/Instructions    Call to EMS/911 recommended. Follow protocol based instructions.     Bring Your Own Device:  Please also bring your smart device(s) (smart phones, tablets, laptops) and their charging cables for your personal use and to communicate with your care team during your visit.    Thank you for taking steps to prevent the spread of this virus.  o Limit your contact with others.  o Wear a simple mask to cover your cough.  o Wash your hands well and often.    Resources    [facility]: About COVID-19: [email or web address]    CDC: What to Do If You're Sick: www.cdc.gov/coronavirus/2019-ncov/about/steps-when-sick.html    CDC: Ending Home Isolation: www.cdc.gov/coronavirus/2019-ncov/hcp/disposition-in-home-patients.html     CDC: Caring for Someone:  www.cdc.gov/coronavirus/2019-ncov/if-you-are-sick/care-for-someone.html     [facility]: Interim Guidance for Hospital Discharge to Home: www.health.[facility].[place]/diseases/coronavirus/hcp/hospdischarge.pdf    [facility] clinical trials (COVID-19 research studies): [email or web address]     Below are the COVID-19 hotlines at the [place] Department of [facility] ([facility]). Interpreters are available.   o For health questions: Call 797-481-8808 or 1-530.622.1778 (7 a.m. to 7 p.m.)  o For questions about schools and childcare: Call 929-166-6675 or 1-253.257.5818 (7 a.m. to 7 p.m.)       Reason for Disposition    New neurologic deficit that is present NOW, sudden onset of ANY of the following: * Weakness of the face, arm, or leg on one side of the body* Numbness of the face, arm, or leg on one side of the body* Loss of speech or garbled speech    Additional Information    Negative: Difficult to awaken or acting confused (e.g., disoriented, slurred speech)    Protocols used: NEUROLOGIC DEFICIT-A-[place]

## 2021-09-14 NOTE — ED TRIAGE NOTES
Pt presents with concerns of right arm numbness.  Pt states at 0300 this morning he had right arm tingling and cold hand, he warmed it and the sensation went away.   Pt states that at 0700 it happened again and has not gone away.  Pt called the clinic and EMS.  EMS cleared him of stroke. EMS then ran an EKG, but told him that they could not clear him cardiac.  Pt states that he has had this issue with his arm on and off for 35 years.  Pt states that occasionally the left arm does this.  Pt states that the right arm has improved since EMS, goes in waves.   At the end of triage pt stated that EMS stated he was not having a cardiac event or a stroke.

## 2021-09-14 NOTE — ED NOTES
Patient works at [employer]. He denies a lot of repetitive, over the head of keyboard activities at work. Milagro Dial RN

## 2021-09-14 NOTE — ED PROVIDER NOTES
"  History     Chief Complaint   Patient presents with     Arm Problem     The history is provided by the patient.     Jeff Brown is a 60 year old male with a history of hypertension who is on amlodipine and daily baby aspirin who presents to the emergency room with tingling, numbness and a \"cold sensation\" in the right arm. It stared at 0300 this morning, then again at 0700 until 1200. Patient explains usually this type of sensation stops after 30-40 minutes when he walks around, and putting arm at side but this time it did not. Patient has a history of this type of sensation but notes it is usually only at night and usually occurs when he is lying on his side. He states that in the 90's he had GERD and moved from sleeping on his back to his side - his symptoms then started. The sensation can happen in both arms but has lately been only in his right. He has an area of pain in the right bicep, and notes the sensation from his elbow down to his whole hand. Patient can improve the sensation by putting his hand into his armpit or between his knees - shaking the arm also helps. Denies chest pain, shortness of breath, nausea, neck pain. The sensation is unchanged with movements of the head or arm itself. Family history of heart disease. Patient contacted his regular clinic and EMS and was advised to come to the ER for concerns of a heart issue.     Allergies:  No Known Allergies    Problem List:    Patient Active Problem List    Diagnosis Date Noted     Morbid obesity (H) 01/28/2021     Priority: Medium     Advanced directives, counseling/discussion 12/31/2015     Priority: Medium     i gave a handout.        Seasonal allergic rhinitis 09/26/2013     Priority: Medium     BMI 36.0-36.9,adult 03/01/2013     Priority: Medium     Hypertension goal BP (blood pressure) < 140/90 09/25/2012     Priority: Medium     CARDIOVASCULAR SCREENING; LDL GOAL LESS THAN 130 09/09/2012     Priority: Medium        Past Medical History:  "   Past Medical History:   Diagnosis Date     Hypertension        Past Surgical History:    Past Surgical History:   Procedure Laterality Date     COLONOSCOPY  2005     ENT SURGERY      tonsils       Family History:    Family History   Problem Relation Age of Onset     Other Cancer Mother         Skin cancer.  Removed last year.     Thyroid Disease Mother      Heart Disease Father 65        htn     Hypertension Father         1st heart attack late 50s.  2nd mid-60s.       Social History:  Marital Status:  Single [1]  Social History     Tobacco Use     Smoking status: Never Smoker     Smokeless tobacco: Never Used   Substance Use Topics     Alcohol use: No     Drug use: No        Medications:    amLODIPine (NORVASC) 5 MG tablet  Ascorbic Acid (VITAMIN C PO)  aspirin (ASPIRIN LOW DOSE) 81 MG tablet  [employer] Fiber Gummy [employer] Children CHEW  FISH OIL  hydrochlorothiazide (HYDRODIURIL) 25 MG tablet  quinapril (ACCUPRIL) 40 MG tablet  VITAMIN D, CHOLECALCIFEROL, PO          Review of Systems   All other systems reviewed and are negative.      Physical Exam   BP: (!) 139/115  Pulse: 113  Temp: 98.3  F (36.8  C)  Resp: 18  Weight: 126.6 kg (279 lb)  SpO2: 99 %      Physical Exam  Vitals and nursing note reviewed.   Constitutional:       General: He is not in acute distress.     Appearance: Normal appearance. He is not ill-appearing or diaphoretic.   HENT:      Head: Normocephalic and atraumatic.      Mouth/Throat:      Mouth: Mucous membranes are moist.      Pharynx: Oropharynx is clear.   Eyes:      General: No scleral icterus.     Extraocular Movements: Extraocular movements intact.      Conjunctiva/sclera: Conjunctivae normal.      Pupils: Pupils are equal, round, and reactive to light.   Cardiovascular:      Rate and Rhythm: Normal rate.   Pulmonary:      Effort: Pulmonary effort is normal. No respiratory distress.   Abdominal:      Tenderness: There is no abdominal tenderness. There is no guarding or rebound.    Musculoskeletal:         General: No swelling or deformity.      Right lower leg: No edema.      Left lower leg: No edema.      Comments: There is tenderness palpation over the medial aspect of the right upper arm.  This does not cause numbness or tingling down the arm.  I can move his arm in all directions without causing the numbness.  Full range of motion of cervical spine without radicular symptoms.   Skin:     General: Skin is warm and dry.      Coloration: Skin is not pale.      Findings: No rash.   Neurological:      General: No focal deficit present.      Mental Status: He is alert and oriented to person, place, and time. Mental status is at baseline.   Psychiatric:         Mood and Affect: Mood normal.         Thought Content: Thought content normal.         ED Course        Procedures      No results found for this or any previous visit (from the past 24 hour(s)).    Medications - No data to display    Assessments & Plan (with Medical Decision Making)  60 year old male with pain in his right bicep and numbness, tingling, feeling cold from his right elbow to his whole right hand. Ongoing issue that has become more persistent. Vitals are stable. No fevers. History and exam are as noted above.  This is definitely not new.  Is not associated with chest pain or with exertion.  It comes on in certain positions which makes me believe this is mechanical nerve compression.  After talking with and examining the patient, cervical spine issues and vascular issues were ruled out. I believe he is having neuropathy - will have him follow up with neurology and his primary provider.   Reasons to return to the ER were discussed. The diagnosis, treatment options, risks and follow-up discussed and all questions answered.      I have reviewed the nursing notes.    I have reviewed the findings, diagnosis, plan and need for follow up with the patient.      Discharge Medication List as of 9/14/2021  1:16 PM          Final  diagnoses:   Nerve pain       This document serves as a record of services personally performed by Sandro Lu MD*. It was created on their behalf by Pati Diaz, a trained medical scribe. The creation of this record is based on the provider's personal observations and the statements of the patient. This document has been checked and approved by the attending provider.    Note: Chart documentation done in part with Dragon Voice Recognition software. Although reviewed after completion, some word and grammatical errors may remain.    9/14/2021   [facility] EMERGENCY DEPT     Sandro Lu MD  09/14/21 [id number]

## 2021-09-14 NOTE — DISCHARGE INSTRUCTIONS
Your numbness/tingling in your arm seems to be mechanical compression or irritation of the nerve in your upper arm.  I placed a referral to neurology for them to follow-up with you on this.  You may need to have nerve studies or further imaging.  Return to the emergency department if you develop chest discomfort shortness of breath sweatiness nausea or other symptoms associated with this numbness in your arm.  I hope you better quickly.

## 2021-09-26 ENCOUNTER — HEALTH MAINTENANCE LETTER (OUTPATIENT)
Age: 61
End: 2021-09-26

## 2021-10-01 ENCOUNTER — ANCILLARY PROCEDURE (OUTPATIENT)
Dept: GENERAL RADIOLOGY | Facility: CLINIC | Age: 61
End: 2021-10-01
Attending: FAMILY MEDICINE
Payer: OTHER GOVERNMENT

## 2021-10-01 ENCOUNTER — OFFICE VISIT (OUTPATIENT)
Dept: FAMILY MEDICINE | Facility: CLINIC | Age: 61
End: 2021-10-01
Payer: OTHER GOVERNMENT

## 2021-10-01 VITALS
HEART RATE: 80 BPM | TEMPERATURE: 97.7 F | DIASTOLIC BLOOD PRESSURE: 87 MMHG | WEIGHT: 275 LBS | OXYGEN SATURATION: 98 % | BODY MASS INDEX: 36.28 KG/M2 | SYSTOLIC BLOOD PRESSURE: 134 MMHG

## 2021-10-01 DIAGNOSIS — R20.0 NUMBNESS AND TINGLING OF UPPER EXTREMITY: ICD-10-CM

## 2021-10-01 DIAGNOSIS — R20.2 NUMBNESS AND TINGLING OF UPPER EXTREMITY: Primary | ICD-10-CM

## 2021-10-01 DIAGNOSIS — I10 HYPERTENSION GOAL BP (BLOOD PRESSURE) < 140/90: ICD-10-CM

## 2021-10-01 DIAGNOSIS — R20.0 NUMBNESS AND TINGLING OF UPPER EXTREMITY: Primary | ICD-10-CM

## 2021-10-01 DIAGNOSIS — R20.2 NUMBNESS AND TINGLING OF UPPER EXTREMITY: ICD-10-CM

## 2021-10-01 DIAGNOSIS — Z23 NEED FOR PROPHYLACTIC VACCINATION AND INOCULATION AGAINST INFLUENZA: ICD-10-CM

## 2021-10-01 PROCEDURE — 99214 OFFICE O/P EST MOD 30 MIN: CPT | Mod: 25 | Performed by: FAMILY MEDICINE

## 2021-10-01 PROCEDURE — 90682 RIV4 VACC RECOMBINANT DNA IM: CPT | Performed by: FAMILY MEDICINE

## 2021-10-01 PROCEDURE — 72040 X-RAY EXAM NECK SPINE 2-3 VW: CPT | Performed by: RADIOLOGY

## 2021-10-01 PROCEDURE — 90471 IMMUNIZATION ADMIN: CPT | Performed by: FAMILY MEDICINE

## 2021-10-01 NOTE — PROGRESS NOTES
SUBJECTIVE:  Jeff Brown, a 60 year old male scheduled an appointment to discuss the following issues:  Need for prophylactic vaccination and inoculation against influenza  Follow-up arm numbness/tingling seen in ER 2 weeks ago.  On/off issues in past. Can be either arm. No weakness. Usually resolves but lasting longer. Had ekg done and ok. No chest pain or shortness of breath.   No rashes. No headaches. No neck pain. New job at [employer] and patient out of shape. Ibuprofen/tylenol. 10 lbs weight loss. No slurred speech or blurry vision.   No hand discoloration.  Per ED note:  60 year old male with pain in his right bicep and numbness, tingling, feeling cold from his right elbow to his whole right hand. Ongoing issue that has become more persistent. Vitals are stable. No fevers. History and exam are as noted above.  This is definitely not new.  Is not associated with chest pain or with exertion.  It comes on in certain positions which makes me believe this is mechanical nerve compression.  After talking with and examining the patient, cervical spine issues and vascular issues were ruled out. I believe he is having neuropathy - will have him follow up with neurology and his primary provider.   Reasons to return to the ER were discussed. The diagnosis, treatment options, risks and follow-up discussed and all questions answered.   Medical, social, surgical, and family histories reviewed.    ROS:  All other ROS negative    OBJECTIVE:  /87   Pulse 80   Temp 97.7  F (36.5  C) (Tympanic)   Wt 124.7 kg (275 lb)   SpO2 98%   BMI 36.28 kg/m    EXAM:  GENERAL APPEARANCE: healthy, alert and no distress  EYES: EOMI,  PERRL  HENT: ear canals and TM's normal and nose and mouth without ulcers or lesions  NECK: no adenopathy, no asymmetry, masses, or scars and thyroid normal to palpation  RESP: lungs clear to auscultation - no rales, rhonchi or wheezes  CV: regular rates and rhythm, normal S1 S2, no S3 or S4 and no  murmur, click or rub -  ABDOMEN:  soft, nontender, no HSM or masses and bowel sounds normal  MS: extremities normal- no gross deformities noted, no evidence of inflammation in joints, FROM in all extremities.  NEURO: Normal strength and tone, sensory exam grossly normal, mentation intact and speech normal  PSYCH: mentation appears normal and affect normal/bright    ASSESSMENT / PLAN:  (R20.0,  R20.2) Numbness and tingling of upper extremity  (primary encounter diagnosis)  Comment: cervical vs peripheral vs vascular?  Plan: XR Cervical Spine 2/3 Views, Spine Referral        Will ask for help. Consider neurology too. To ER if a lot worse or new neuro symptoms. Expected course and warning signs reviewed. Call/email with questions/concerns     (Z23) Need for prophylactic vaccination and inoculation against influenza  Plan: INFLUENZA QUAD, RECOMBINANT, P-FREE (RIV4)         (FLUBLOK)            (I10) Hypertension goal BP (blood pressure) < 140/90  Comment: stable  Plan: continue meds. Recheck labs/bp in next 1-2 months - sooner if worse. Self-monitor. Chest pain or shortness of breath to er.     Margarito Casas MD

## 2021-10-14 ENCOUNTER — OFFICE VISIT (OUTPATIENT)
Dept: NEUROSURGERY | Facility: CLINIC | Age: 61
End: 2021-10-14
Attending: FAMILY MEDICINE
Payer: OTHER GOVERNMENT

## 2021-10-14 VITALS
WEIGHT: 272.2 LBS | HEIGHT: 73 IN | BODY MASS INDEX: 36.08 KG/M2 | DIASTOLIC BLOOD PRESSURE: 86 MMHG | TEMPERATURE: 96.3 F | SYSTOLIC BLOOD PRESSURE: 138 MMHG

## 2021-10-14 DIAGNOSIS — I10 HYPERTENSION GOAL BP (BLOOD PRESSURE) < 140/90: ICD-10-CM

## 2021-10-14 DIAGNOSIS — R20.2 NUMBNESS AND TINGLING OF UPPER EXTREMITY: ICD-10-CM

## 2021-10-14 DIAGNOSIS — Z86.59 HISTORY OF CLAUSTROPHOBIA: Primary | ICD-10-CM

## 2021-10-14 DIAGNOSIS — R20.0 NUMBNESS AND TINGLING OF UPPER EXTREMITY: ICD-10-CM

## 2021-10-14 PROCEDURE — 99243 OFF/OP CNSLTJ NEW/EST LOW 30: CPT | Performed by: NURSE PRACTITIONER

## 2021-10-14 RX ORDER — DIAZEPAM 5 MG
5 TABLET ORAL
Qty: 2 TABLET | Refills: 0 | Status: SHIPPED | OUTPATIENT
Start: 2021-10-14 | End: 2022-02-08

## 2021-10-14 RX ORDER — AMLODIPINE BESYLATE 5 MG/1
TABLET ORAL
Qty: 90 TABLET | Refills: 0 | Status: SHIPPED | OUTPATIENT
Start: 2021-10-14 | End: 2022-02-07

## 2021-10-14 ASSESSMENT — PAIN SCALES - GENERAL: PAINLEVEL: MILD PAIN (3)

## 2021-10-14 ASSESSMENT — MIFFLIN-ST. JEOR: SCORE: 2098.57

## 2021-10-14 NOTE — PROGRESS NOTES
"Jeff Brown is a 60 year old male who presents for:  Chief Complaint   Patient presents with     Consult     L/E: numbness / tingling         Initial Vitals:  Ht 6' 1\" (1.854 m)   Wt 272 lb 3.2 oz (123.5 kg)   BMI 35.91 kg/m   Estimated body mass index is 35.91 kg/m  as calculated from the following:    Height as of this encounter: 6' 1\" (1.854 m).    Weight as of this encounter: 272 lb 3.2 oz (123.5 kg).. Body surface area is 2.52 meters squared. BP completed using cuff size: regular  Data Unavailable    Nursing Comments:    Carmen Paniagua    "

## 2021-10-14 NOTE — LETTER
"    10/14/2021         RE: Jeff Brown  [address] [facility] [address]        Dear Colleague,    Thank you for referring your patient, Jeff Brown, to the [facility] NEUROS[facility]IC [place]. Please see a copy of my visit note below.    Jeff Brown is a 60 year old male who presents for:  Chief Complaint   Patient presents with     Consult     L/E: numbness / tingling         Initial Vitals:  Ht 6' 1\" (1.854 m)   Wt 272 lb 3.2 oz (123.5 kg)   BMI 35.91 kg/m   Estimated body mass index is 35.91 kg/m  as calculated from the following:    Height as of this encounter: 6' 1\" (1.854 m).    Weight as of this encounter: 272 lb 3.2 oz (123.5 kg).. Body surface area is 2.52 meters squared. BP completed using cuff size: regular  Data Unavailable    Nursing Comments:    Carmen Paniagua      [facility] Neurosurgery  Neurosurgery Clinic Visit      CC: numbness and tingling of upper extremity    Primary care Provider: Margarito Casas    Reason For Visit:   I was asked by Dr. Casas to consult on the patient for numbness and tingling.    HPI: Jeff Brown is a 60 year old male with a history of intermittent arm paresthesias presents with 1 month of right>left arm paresthesias. He denies injury at the onset. States the paresthesias begin in the morning after he wakes up. He states they are from his right>left elbow to the hand. He denies neck pain and weakness. He has had a cervical XR which showed degenerative changes. He has not had an MRI or EMG.    Past Medical History:   Diagnosis Date     Hypertension        Past Medical History reviewed with patient during visit.    Past Surgical History:   Procedure Laterality Date     COLONOSCOPY  2005     ENT SURGERY      tonsils     Past Surgical History reviewed with patient during visit.    Current Outpatient Medications   Medication     amLODIPine (NORVASC) 5 MG tablet     Ascorbic Acid (VITAMIN C PO)     aspirin (ASPIRIN LOW DOSE) " 81 MG tablet     [employer] Fiber Gummy [employer] Children CHEW     diazepam (VALIUM) 5 MG tablet     FISH OIL     hydrochlorothiazide (HYDRODIURIL) 25 MG tablet     quinapril (ACCUPRIL) 40 MG tablet     VITAMIN D, CHOLECALCIFEROL, PO     No current facility-administered medications for this visit.       No Known Allergies    Social History     Socioeconomic History     Marital status: Single     Spouse name: Not on file     Number of children: 0     Years of education: Not on file     Highest education level: Not on file   Occupational History     Employer: [employer]   Tobacco Use     Smoking status: Never Smoker     Smokeless tobacco: Never Used   Substance and Sexual Activity     Alcohol use: No     Drug use: No     Sexual activity: Not Currently     Partners: Female   Other Topics Concern     Parent/sibling w/ CABG, MI or angioplasty before 65F 55M? No      Service Yes     Blood Transfusions No     Caffeine Concern No     Occupational Exposure No     Hobby Hazards No     Sleep Concern No     Stress Concern No     Weight Concern Yes     Special Diet No     Back Care Yes     Exercise Yes     Bike Helmet No     Seat Belt Yes     Self-Exams No   Social History Narrative     Not on file     Social Determinants of Health     Financial Resource Strain:      Difficulty of Paying Living Expenses:    Food Insecurity:      Worried About Running Out of Food in the Last Year:      Ran Out of Food in the Last Year:    Transportation Needs:      Lack of Transportation (Medical):      Lack of Transportation (Non-Medical):    Physical Activity:      Days of Exercise per Week:      Minutes of Exercise per Session:    Stress:      Feeling of Stress :    Social Connections:      Frequency of Communication with Friends and Family:      Frequency of Social Gatherings with Friends and Family:      Attends Jehovah's witness Services:      Active Member of Clubs or Organizations:      Attends Club or Organization Meetings:      Marital Status:   "  Intimate Partner Violence:      Fear of Current or Ex-Partner:      Emotionally Abused:      Physically Abused:      Sexually Abused:        Family History   Problem Relation Age of Onset     Other Cancer Mother         Skin cancer.  Removed last year.     Thyroid Disease Mother      Heart Disease Father 65        htn     Hypertension Father         1st heart attack late 50s.  2nd mid-60s.         ROS: 10 point ROS neg other than the symptoms noted above in the HPI.    Vital Signs:   /86 (BP Location: Right arm, Patient Position: Sitting, Cuff Size: Adult Large)   Temp (!) 96.3  F (35.7  C) (Temporal)   Ht 6' 1\" (1.854 m)   Wt 272 lb 3.2 oz (123.5 kg)   BMI 35.91 kg/m        Examination:  Constitutional:  Alert, well nourished, NAD.  Memory: recent and remote memory   HEENT: Normocephalic, atraumatic.   Pulm:  Without shortness of breath   CV:  No pitting edema of BLE.      Neurological:  Awake  Alert  Oriented x 3  Speech clear  Tongue midline    Motor exam:  Shoulder Abduction:  Right:  5/5   Left:  5/5  Biceps:                      Right:  5/5   Left:  5/5  Triceps:                     Right:  5/5   Left:  5/5  Wrist Extensors:       Right:  5/5   Left:  5/5  Wrist Flexors:           Right:  5/5   Left:  5/5  Intrinsics:                   Right:  5/5   Left:  5/5    Sensation normal to bilateral upper and lower extremities  Muscle tone to bilateral upper and lower extremities   Gait: Able to stand from a seated position. Normal non-antalgic, non-myelopathic gait.  Able to heel/toe walk without loss of balance    Cervical examination reveals good range of motion.  No tenderness to palpation of the cervical spine or paraspinous muscles bilaterally.    Imaging:   Cervical XR 10/1/2021  IMPRESSION: There is normal alignment of the cervical vertebrae; however, there is straightening of normal cervical lordosis. Vertebral body heights of the cervical spine are normal. Craniocervical alignment is normal. " There is no evidence for fracture of the cervical spine. Loss of disc space height and degenerative endplate spurring at C3-C4, C4-C5 and C5-C6. Mild facet arthropathy throughout the cervical spine.    Assessment/Plan:   Paresthesias of right arm. Reviewed cervcial XR. Plan for cervical MRI at this time for further evaluation. I will contact him with the results and further recommendations. Pending results of the cervical MRI would consider RUE EMG at that time. He verbalized understanding and agreement.    Patient Instructions   -Cervical MRI ordered. Please contact [phone] to schedule.  -Pre medication for MRI sent to your pharmacy. Please take as directed.  -Please contact our clinic with questions or concerns at [phone].      Laurie Mcpherson CNP  [facility] Neurosurgery  [address] [place] [id number]  Tel [phone]  Fax [phone]        Again, thank you for allowing me to participate in the care of your patient.        Sincerely,        Laurie Mcpherson, TELLO

## 2021-10-14 NOTE — PATIENT INSTRUCTIONS
-Cervical MRI ordered. Please contact [phone] to schedule.  -Pre medication for MRI sent to your pharmacy. Please take as directed.  -Please contact our clinic with questions or concerns at [phone].

## 2021-10-14 NOTE — PROGRESS NOTES
[facility] Neurosurgery  Neurosurgery Clinic Visit      CC: numbness and tingling of upper extremity    Primary care Provider: Margarito Casas    Reason For Visit:   I was asked by Dr. Casas to consult on the patient for numbness and tingling.    HPI: Jeff Brown is a 60 year old male with a history of intermittent arm paresthesias presents with 1 month of right>left arm paresthesias. He denies injury at the onset. States the paresthesias begin in the morning after he wakes up. He states they are from his right>left elbow to the hand. He denies neck pain and weakness. He has had a cervical XR which showed degenerative changes. He has not had an MRI or EMG.    Past Medical History:   Diagnosis Date     Hypertension        Past Medical History reviewed with patient during visit.    Past Surgical History:   Procedure Laterality Date     COLONOSCOPY  2005     ENT SURGERY      tonsils     Past Surgical History reviewed with patient during visit.    Current Outpatient Medications   Medication     amLODIPine (NORVASC) 5 MG tablet     Ascorbic Acid (VITAMIN C PO)     aspirin (ASPIRIN LOW DOSE) 81 MG tablet     [employer] Fiber [employer]ummy [employer] Children CHEW     diazepam (VALIUM) 5 MG tablet     FISH OIL     hydrochlorothiazide (HYDRODIURIL) 25 MG tablet     quinapril (ACCUPRIL) 40 MG tablet     VITAMIN D, CHOLECALCIFEROL, PO     No current facility-administered medications for this visit.       No Known Allergies    Social History     Socioeconomic History     Marital status: Single     Spouse name: Not on file     Number of children: 0     Years of education: Not on file     Highest education level: Not on file   Occupational History     Employer: [employer]   Tobacco Use     Smoking status: Never Smoker     Smokeless tobacco: Never Used   Substance and Sexual Activity     Alcohol use: No     Drug use: No     Sexual activity: Not Currently     Partners: Female   Other Topics Concern     Parent/sibling w/ CABG, MI or  "angioplasty before 65F 55M? No      Service Yes     Blood Transfusions No     Caffeine Concern No     Occupational Exposure No     Hobby Hazards No     Sleep Concern No     Stress Concern No     Weight Concern Yes     Special Diet No     Back Care Yes     Exercise Yes     Bike Helmet No     Seat Belt Yes     Self-Exams No   Social History Narrative     Not on file     Social Determinants of Health     Financial Resource Strain:      Difficulty of Paying Living Expenses:    Food Insecurity:      Worried About Running Out of Food in the Last Year:      Ran Out of Food in the Last Year:    Transportation Needs:      Lack of Transportation (Medical):      Lack of Transportation (Non-Medical):    Physical Activity:      Days of Exercise per Week:      Minutes of Exercise per Session:    Stress:      Feeling of Stress :    Social Connections:      Frequency of Communication with Friends and Family:      Frequency of Social Gatherings with Friends and Family:      Attends Buddhism Services:      Active Member of Clubs or Organizations:      Attends Club or Organization Meetings:      Marital Status:    Intimate Partner Violence:      Fear of Current or Ex-Partner:      Emotionally Abused:      Physically Abused:      Sexually Abused:        Family History   Problem Relation Age of Onset     Other Cancer Mother         Skin cancer.  Removed last year.     Thyroid Disease Mother      Heart Disease Father 65        htn     Hypertension Father         1st heart attack late 50s.  2nd mid-60s.         ROS: 10 point ROS neg other than the symptoms noted above in the HPI.    Vital Signs:   /86 (BP Location: Right arm, Patient Position: Sitting, Cuff Size: Adult Large)   Temp (!) 96.3  F (35.7  C) (Temporal)   Ht 6' 1\" (1.854 m)   Wt 272 lb 3.2 oz (123.5 kg)   BMI 35.91 kg/m        Examination:  Constitutional:  Alert, well nourished, NAD.  Memory: recent and remote memory   HEENT: Normocephalic, atraumatic. "   Pulm:  Without shortness of breath   CV:  No pitting edema of BLE.      Neurological:  Awake  Alert  Oriented x 3  Speech clear  Tongue midline    Motor exam:  Shoulder Abduction:  Right:  5/5   Left:  5/5  Biceps:                      Right:  5/5   Left:  5/5  Triceps:                     Right:  5/5   Left:  5/5  Wrist Extensors:       Right:  5/5   Left:  5/5  Wrist Flexors:           Right:  5/5   Left:  5/5  Intrinsics:                   Right:  5/5   Left:  5/5    Sensation normal to bilateral upper and lower extremities  Muscle tone to bilateral upper and lower extremities   Gait: Able to stand from a seated position. Normal non-antalgic, non-myelopathic gait.  Able to heel/toe walk without loss of balance    Cervical examination reveals good range of motion.  No tenderness to palpation of the cervical spine or paraspinous muscles bilaterally.    Imaging:   Cervical XR 10/1/2021  IMPRESSION: There is normal alignment of the cervical vertebrae; however, there is straightening of normal cervical lordosis. Vertebral body heights of the cervical spine are normal. Craniocervical alignment is normal. There is no evidence for fracture of the cervical spine. Loss of disc space height and degenerative endplate spurring at C3-C4, C4-C5 and C5-C6. Mild facet arthropathy throughout the cervical spine.    Assessment/Plan:   Paresthesias of right arm. Reviewed cervcial XR. Plan for cervical MRI at this time for further evaluation. I will contact him with the results and further recommendations. Pending results of the cervical MRI would consider RUE EMG at that time. He verbalized understanding and agreement.    Patient Instructions   -Cervical MRI ordered. Please contact [phone] to schedule.  -Pre medication for MRI sent to your pharmacy. Please take as directed.  -Please contact our clinic with questions or concerns at [phone].      Laurie Mcpherson, [address]  [address] [address]  Tel [phone]  Fax [phone]

## 2021-10-27 ENCOUNTER — HOSPITAL ENCOUNTER (OUTPATIENT)
Dept: MRI IMAGING | Facility: CLINIC | Age: 61
Discharge: HOME | End: 2021-10-27
Attending: NURSE PRACTITIONER | Admitting: NURSE PRACTITIONER
Payer: OTHER GOVERNMENT

## 2021-10-27 DIAGNOSIS — R20.0 NUMBNESS AND TINGLING OF UPPER EXTREMITY: ICD-10-CM

## 2021-10-27 DIAGNOSIS — R20.2 NUMBNESS AND TINGLING OF UPPER EXTREMITY: ICD-10-CM

## 2021-10-27 PROCEDURE — 72141 MRI NECK SPINE W/O DYE: CPT

## 2021-11-02 ENCOUNTER — TELEPHONE (OUTPATIENT)
Dept: NEUROSURGERY | Facility: CLINIC | Age: 61
End: 2021-11-02

## 2021-11-02 DIAGNOSIS — R20.0 NUMBNESS AND TINGLING OF UPPER EXTREMITY: Primary | ICD-10-CM

## 2021-11-02 DIAGNOSIS — R20.2 NUMBNESS AND TINGLING OF UPPER EXTREMITY: Primary | ICD-10-CM

## 2021-11-02 NOTE — TELEPHONE ENCOUNTER
Contacted patient to discuss cervical MRI. Would recommend RUE EMG at this time for further evaluation. He verbalized understanding and agreement.

## 2022-02-07 ENCOUNTER — OFFICE VISIT (OUTPATIENT)
Dept: NEUROLOGY | Facility: CLINIC | Age: 62
End: 2022-02-07
Attending: NURSE PRACTITIONER
Payer: OTHER GOVERNMENT

## 2022-02-07 DIAGNOSIS — R20.2 NUMBNESS AND TINGLING OF UPPER EXTREMITY: ICD-10-CM

## 2022-02-07 DIAGNOSIS — R20.0 NUMBNESS AND TINGLING OF UPPER EXTREMITY: ICD-10-CM

## 2022-02-07 DIAGNOSIS — I10 HYPERTENSION GOAL BP (BLOOD PRESSURE) < 140/90: ICD-10-CM

## 2022-02-07 PROCEDURE — 95886 MUSC TEST DONE W/N TEST COMP: CPT | Performed by: PSYCHIATRY & NEUROLOGY

## 2022-02-07 PROCEDURE — 95909 NRV CNDJ TST 5-6 STUDIES: CPT | Performed by: PSYCHIATRY & NEUROLOGY

## 2022-02-07 RX ORDER — QUINAPRIL 40 MG/1
TABLET ORAL
Qty: 90 TABLET | Refills: 1 | Status: SHIPPED | OUTPATIENT
Start: 2022-02-07 | End: 2023-01-03

## 2022-02-07 RX ORDER — AMLODIPINE BESYLATE 5 MG/1
5 TABLET ORAL DAILY
Qty: 90 TABLET | Refills: 0 | Status: SHIPPED | OUTPATIENT
Start: 2022-02-07 | End: 2022-10-04

## 2022-02-07 RX ORDER — HYDROCHLOROTHIAZIDE 25 MG/1
25 TABLET ORAL DAILY
Qty: 90 TABLET | Refills: 1 | Status: SHIPPED | OUTPATIENT
Start: 2022-02-07 | End: 2023-06-01

## 2022-02-07 NOTE — PROGRESS NOTES
[facility]   EMG Laboratory      Nerve Conduction & EMG Report          Patient:       Jeff Brown  Patient ID:    [id number]  Gender:        Male  YOB: 1960  Age:           61 Years 1 Months      History and Examination:  Jeff Brown is a 61 year old man who reports numbness in the right hand upon awakening or when resting. Symptoms often affect only part of the hand at a time but it is his perception that all digits can be affected. Imaging demonstrates degenerative changes in the rostral cervical spine. Examination demonstrates full strength. He is referred for evaluation of possible entrapment neuropathy or radiculopathy.    Techniques:  Motor conduction studies were done with surface recording electrodes. Sensory conduction studies were performed with surface electrodes, unless indicated otherwise by (n), designating the use of subdermal recording electrodes. Temperature was monitored and recorded throughout the study. Upper extremities were maintained at a temperature of 32 degrees Centigrade or higher.  EMG was done with a concentric needle electrode.     Results:  A right median sensory conduction study demonstrated marked attenuation of amplitude and severe slowing of conduction. Right ulnar and radial sensory conduction studies were normal. Right median and ulnar motor conduction studies demonstrated moderately severe prolongation of median distal latencies and were otherwise normal. Electromyography was normal.     Interpretation:  This is an abnormal study, demonstrating electrophysiologic evidence of the followin. 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   2. No evidence of polyneuropathy.  3. No evidence of cervical radiculopathy.    Tuan wSain MD        Sensory NCS      Nerve / Sites Rec. Site Onset Peak NP Amp Ref. PP Amp Dist Ab Ref. Temp     ms ms  V  V  V cm m/s m/s  C   R MEDIAN - Dig II Anti      Wrist Dig II 6.30 7.50 2.5 10.0 1.8 14  22.2 48.0 33.5   R ULNAR - Dig V Anti      Wrist Dig V 2.45 3.07 13.5 8.0 17.7 12.5 51.1 48.0 33.5   R RADIAL - Snuff      Forearm Snuff 2.34 3.07 18.9 15.0 22.1 12.5 53.3 48.0 33.5       Motor NCS      Nerve / Sites Rec. Site Lat Ref. Amp Ref. Rel Amp Dist Ab Ref. Dur. Area Temp.     ms ms mV mV % cm m/s m/s ms %  C   R MEDIAN - APB      Wrist APB 7.97 4.40 6.5 5.0 100 8   8.96 100 32.5      Elbow APB 12.14  6.4  97.1 21.5 51.6 48.0 9.38 99.6 32   R ULNAR - ADM      Wrist ADM 2.76 3.50 9.7 5.0 100 8   7.03 100 33.4      B.Elbow ADM 7.24  9.2  94.8 23 51.3 48.0 7.29 99.1 33.4      A.Elbow ADM 9.06  9.5  97.8 9 49.4 48.0 7.66 98.8 33   R MEDIAN - II Lumb      Median II Lumb 7.19  1.1  100 10   6.77 100 33.5      Ulnar Palm Int 3.39  2.2  197 10   6.77 166 33.5       EMG Summary Table     Spontaneous MUAP Recruitment    IA Fib PSW Fasc H.F. Amp Dur. PPP Pattern   R. PRON TERES N None None None None N N N N   R. BICEPS N None None None None N N N N   R. EXT DIG COMM N None None None None N N N N   R. FIRST D INTEROSS N None None None None N N N N   R. ABD POLL BREVIS N None None None None N N N N   R. TRICEPS N None None None None N N N N   R. DELTOID N None None None None N N N N   R. SUPRASPINATUS N None None None None N N N N   R. C7 PSP N None None None None N N N N   R. C5 PSP N None None None None N N N N

## 2022-02-07 NOTE — LETTER
2022         RE: Jeff Brown  [address] [facility] [address]        Dear Colleague,    Thank you for referring your patient, Jeff Brown, to the [facility] [place]. Please see a copy of my visit note below.      [facility]   EMG Laboratory      Nerve Conduction & EMG Report          Patient:       Jeff Brown  Patient ID:    [id number]  Gender:        Male  YOB: 1960  Age:           61 Years 1 Months      History and Examination:  Jeff Brown is a 61 year old man who reports numbness in the right hand upon awakening or when resting. Symptoms often affect only part of the hand at a time but it is his perception that all digits can be affected. Imaging demonstrates degenerative changes in the rostral cervical spine. Examination demonstrates full strength. He is referred for evaluation of possible entrapment neuropathy or radiculopathy.    Techniques:  Motor conduction studies were done with surface recording electrodes. Sensory conduction studies were performed with surface electrodes, unless indicated otherwise by (n), designating the use of subdermal recording electrodes. Temperature was monitored and recorded throughout the study. Upper extremities were maintained at a temperature of 32 degrees Centigrade or higher.  EMG was done with a concentric needle electrode.     Results:  A right median sensory conduction study demonstrated marked attenuation of amplitude and severe slowing of conduction. Right ulnar and radial sensory conduction studies were normal. Right median and ulnar motor conduction studies demonstrated moderately severe prolongation of median distal latencies and were otherwise normal. Electromyography was normal.     Interpretation:  This is an abnormal study, demonstrating electrophysiologic evidence of the followin. 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   2. No evidence of  polyneuropathy.  3. No evidence of cervical radiculopathy.    Tuan Swain MD        Sensory NCS      Nerve / Sites Rec. Site Onset Peak NP Amp Ref. PP Amp Dist Ab Ref. Temp     ms ms  V  V  V cm m/s m/s  C   R MEDIAN - Dig II Anti      Wrist Dig II 6.30 7.50 2.5 10.0 1.8 14 22.2 48.0 33.5   R ULNAR - Dig V Anti      Wrist Dig V 2.45 3.07 13.5 8.0 17.7 12.5 51.1 48.0 33.5   R RADIAL - Snuff      Forearm Snuff 2.34 3.07 18.9 15.0 22.1 12.5 53.3 48.0 33.5       Motor NCS      Nerve / Sites Rec. Site Lat Ref. Amp Ref. Rel Amp Dist Ab Ref. Dur. Area Temp.     ms ms mV mV % cm m/s m/s ms %  C   R MEDIAN - APB      Wrist APB 7.97 4.40 6.5 5.0 100 8   8.96 100 32.5      Elbow APB 12.14  6.4  97.1 21.5 51.6 48.0 9.38 99.6 32   R ULNAR - ADM      Wrist ADM 2.76 3.50 9.7 5.0 100 8   7.03 100 33.4      B.Elbow ADM 7.24  9.2  94.8 23 51.3 48.0 7.29 99.1 33.4      A.Elbow ADM 9.06  9.5  97.8 9 49.4 48.0 7.66 98.8 33   R MEDIAN - II Lumb      Median II Lumb 7.19  1.1  100 10   6.77 100 33.5      Ulnar Palm Int 3.39  2.2  197 10   6.77 166 33.5       EMG Summary Table     Spontaneous MUAP Recruitment    IA Fib PSW Fasc H.F. Amp Dur. PPP Pattern   R. PRON TERES N None None None None N N N N   R. BICEPS N None None None None N N N N   R. EXT DIG COMM N None None None None N N N N   R. FIRST D INTEROSS N None None None None N N N N   R. ABD POLL BREVIS N None None None None N N N N   R. TRICEPS N None None None None N N N N   R. DELTOID N None None None None N N N N   R. SUPRASPINATUS N None None None None N N N N   R. C7 PSP N None None None None N N N N   R. C5 PSP N None None None None N N N N                          Again, thank you for allowing me to participate in the care of your patient.        Sincerely,        Tuan Swain MD

## 2022-02-07 NOTE — TELEPHONE ENCOUNTER
Routing refill request to provider for review/approval because:  A break in medication.  Angeline Green RN

## 2022-02-08 ENCOUNTER — TELEPHONE (OUTPATIENT)
Dept: NEUROSURGERY | Facility: CLINIC | Age: 62
End: 2022-02-08
Payer: OTHER GOVERNMENT

## 2022-02-08 NOTE — TELEPHONE ENCOUNTER
Contacted patient to discuss right arm EMG. He states his symptoms have improved with a wrist brace. Discussed hand therapy vs follow up with the surgeon. He would like to continue to wear the brace at this time as he has had improvement in symptoms. He will contact our office if he would like to pursue further treatments. He verbalized understanding and agreement.

## 2022-02-10 ENCOUNTER — DOCUMENTATION ONLY (OUTPATIENT)
Dept: LAB | Facility: OTHER | Age: 62
End: 2022-02-10
Payer: OTHER GOVERNMENT

## 2022-02-10 DIAGNOSIS — Z13.6 CARDIOVASCULAR SCREENING; LDL GOAL LESS THAN 130: ICD-10-CM

## 2022-02-10 DIAGNOSIS — Z12.5 SCREENING PSA (PROSTATE SPECIFIC ANTIGEN): Primary | ICD-10-CM

## 2022-02-10 DIAGNOSIS — Z00.00 PREVENTATIVE HEALTH CARE: ICD-10-CM

## 2022-02-10 NOTE — PROGRESS NOTES
Patient is coming to [facility] without any orders. Please place lab orders as needed for patient.    Thanks!   Denis WRIGHT) [place]

## 2022-03-03 ENCOUNTER — LAB (OUTPATIENT)
Dept: LAB | Facility: OTHER | Age: 62
End: 2022-03-03
Payer: OTHER GOVERNMENT

## 2022-03-03 DIAGNOSIS — Z00.00 PREVENTATIVE HEALTH CARE: ICD-10-CM

## 2022-03-03 DIAGNOSIS — Z13.6 CARDIOVASCULAR SCREENING; LDL GOAL LESS THAN 130: ICD-10-CM

## 2022-03-03 DIAGNOSIS — Z12.5 SCREENING PSA (PROSTATE SPECIFIC ANTIGEN): ICD-10-CM

## 2022-03-03 LAB
ALBUMIN SERPL-MCNC: 3.5 G/DL (ref 3.4–5)
ALP SERPL-CCNC: 65 U/L (ref 40–150)
ALT SERPL W P-5'-P-CCNC: 31 U/L (ref 0–70)
ANION GAP SERPL CALCULATED.3IONS-SCNC: 4 MMOL/L (ref 3–14)
AST SERPL W P-5'-P-CCNC: 18 U/L (ref 0–45)
BILIRUB SERPL-MCNC: 0.7 MG/DL (ref 0.2–1.3)
BUN SERPL-MCNC: 17 MG/DL (ref 7–30)
CALCIUM SERPL-MCNC: 9.7 MG/DL (ref 8.5–10.1)
CHLORIDE BLD-SCNC: 107 MMOL/L (ref 94–109)
CHOLEST SERPL-MCNC: 152 MG/DL
CO2 SERPL-SCNC: 27 MMOL/L (ref 20–32)
CREAT SERPL-MCNC: 1.07 MG/DL (ref 0.66–1.25)
ERYTHROCYTE [DISTWIDTH] IN BLOOD BY AUTOMATED COUNT: 13.9 % (ref 10–15)
FASTING STATUS PATIENT QL REPORTED: YES
GFR SERPL CREATININE-BSD FRML MDRD: 79 ML/MIN/1.73M2
GLUCOSE BLD-MCNC: 124 MG/DL (ref 70–99)
HCT VFR BLD AUTO: 46.9 % (ref 40–53)
HDLC SERPL-MCNC: 48 MG/DL
HGB BLD-MCNC: 15.6 G/DL (ref 13.3–17.7)
LDLC SERPL CALC-MCNC: 84 MG/DL
MCH RBC QN AUTO: 28 PG (ref 26.5–33)
MCHC RBC AUTO-ENTMCNC: 33.3 G/DL (ref 31.5–36.5)
MCV RBC AUTO: 84 FL (ref 78–100)
NONHDLC SERPL-MCNC: 104 MG/DL
PLATELET # BLD AUTO: 217 10E3/UL (ref 150–450)
POTASSIUM BLD-SCNC: 3.9 MMOL/L (ref 3.4–5.3)
PROT SERPL-MCNC: 7.3 G/DL (ref 6.8–8.8)
PSA SERPL-MCNC: 1.47 UG/L (ref 0–4)
RBC # BLD AUTO: 5.58 10E6/UL (ref 4.4–5.9)
SODIUM SERPL-SCNC: 138 MMOL/L (ref 133–144)
TRIGL SERPL-MCNC: 99 MG/DL
WBC # BLD AUTO: 8.6 10E3/UL (ref 4–11)

## 2022-03-03 PROCEDURE — G0103 PSA SCREENING: HCPCS

## 2022-03-03 PROCEDURE — 36415 COLL VENOUS BLD VENIPUNCTURE: CPT

## 2022-03-03 PROCEDURE — 80053 COMPREHEN METABOLIC PANEL: CPT

## 2022-03-03 PROCEDURE — 85027 COMPLETE CBC AUTOMATED: CPT

## 2022-03-03 PROCEDURE — 80061 LIPID PANEL: CPT

## 2022-03-08 NOTE — PROGRESS NOTES
SUBJECTIVE:   CC: Jeff Brown is an 61 year old male who presents for preventative health visit.    Follow-up htn/obesity and hyperglycemia.  Outside blood pressure reading ok. Family history dm.   No chest pain or shortness of breath. No nausea, vomiting or diarrhea or black/bloody stoosl. No testicle/masses/hernia. No hematuria or dysuria.   Eye exam soon. Dentist regularly.   Sleep - interested at times with arm numbness intermittent and  Nocturia x1. Likely carpel tunnel. Going to do physical therapist.   Emotionally doing ok. No rashes/mole changes. Taking vitaminD.   Active at work. Lots of walking.   Patient has been advised of split billing requirements and indicates understanding: Yes  Healthy Habits:     Getting at least 3 servings of Calcium per day:  NO    Bi-annual eye exam:  NO    Dental care twice a year:  Yes    Sleep apnea or symptoms of sleep apnea:  None    Diet:  Regular (no restrictions)    Frequency of exercise:  None    Taking medications regularly:  Yes    Medication side effects:  None    PHQ-2 Total Score: 0    Additional concerns today:  Yes      Today's PHQ-2 Score:   PHQ-2 ( 1999 Pf[employer]) 3/9/2022   Q1: Little interest or pleasure in doing things 0   Q2: Feeling down, depressed or hopeless 0   PHQ-2 Score 0   PHQ-2 Total Score (12-17 Years)- Positive if 3 or more points; Administer PHQ-A if positive -   Q1: Little interest or pleasure in doing things Not at all   Q2: Feeling down, depressed or hopeless Not at all   PHQ-2 Score 0       Abuse: Current or Past(Physical, Sexual or Emotional)- No  Do you feel safe in your environment? Yes    Have you ever done Advance Care Planning? (For example, a Health Directive, POLST, or a discussion with a medical provider or your loved ones about your wishes): No, advance care planning information given to patient to review.  Advanced care planning was discussed at today's visit.    Social History     Tobacco Use     Smoking status: Never Smoker  "    Smokeless tobacco: Never Used   Substance Use Topics     Alcohol use: No         Alcohol Use 3/9/2022   Prescreen: >3 drinks/day or >7 drinks/week? Not Applicable   Prescreen: >3 drinks/day or >7 drinks/week? -       Last PSA:   PSA   Date Value Ref Range Status   03/05/2021 1.74 0 - 4 ug/L Final     Comment:     Assay Method:  Chemiluminescence using Siemens Vista analyzer     Prostate Specific Antigen Screen   Date Value Ref Range Status   03/03/2022 1.47 0.00 - 4.00 ug/L Final       Reviewed orders with patient. Reviewed health maintenance and updated orders accordingly - Yes  Labs reviewed in EPIC    Reviewed and updated as needed this visit by clinical staff                  Reviewed and updated as needed this visit by Provider                     Review of Systems   Constitutional: Negative for chills and fever.   HENT: Negative for congestion, ear pain, hearing loss and sore throat.    Eyes: Negative for pain and visual disturbance.   Respiratory: Negative for cough and shortness of breath.    Cardiovascular: Negative for chest pain, palpitations and peripheral edema.   Gastrointestinal: Negative for abdominal pain, constipation, diarrhea, heartburn, hematochezia and nausea.   Genitourinary: Negative for dysuria, frequency, genital sores, hematuria, impotence, penile discharge and urgency.   Musculoskeletal: Negative for arthralgias, joint swelling and myalgias.   Skin: Negative for rash.   Neurological: Negative for dizziness, weakness, headaches and paresthesias.   Psychiatric/Behavioral: Negative for mood changes. The patient is not nervous/anxious.      All other ROS negative.     OBJECTIVE:   /82   Pulse 68   Temp 98.3  F (36.8  C) (Oral)   Ht 1.854 m (6' 1\")   Wt 125.6 kg (276 lb 12.8 oz)   SpO2 98%   BMI 36.52 kg/m      Physical Exam  GENERAL: healthy, alert and no distress  EYES: Eyes grossly normal to inspection, PERRL and conjunctivae and sclerae normal  HENT: ear canals and TM's " normal, nose and mouth without ulcers or lesions  NECK: no adenopathy, no asymmetry, masses, or scars and thyroid normal to palpation  RESP: lungs clear to auscultation - no rales, rhonchi or wheezes  BREAST: normal without masses, tenderness or nipple discharge and no palpable axillary masses or adenopathy  CV: regular rate and rhythm, normal S1 S2, no S3 or S4, no murmur, click or rub, no peripheral edema and peripheral pulses strong  ABDOMEN: soft, nontender, no hepatosplenomegaly, no masses and bowel sounds normal   (male): patient deferred /rectal exams.  MS: no gross musculoskeletal defects noted, no edema  SKIN: no suspicious lesions or rashes  NEURO: Normal strength and tone, mentation intact and speech normal  PSYCH: mentation appears normal, affect normal/bright  LYMPH: no cervical, supraclavicular, axillary adenopathy    ASSESSMENT/PLAN:   ASSESSMENT / PLAN:  (Z00.00) Routine general medical examination at a health care facility  (primary encounter diagnosis)  Comment: generally healthy and normal exam/labsd  Plan: Reviewed self mole/testicle check handout.  Continue vitaminD.     (Z68.36) BMI 36.0-36.9,adult  Comment: improving  Plan: diet/exercise. Consider jardiance.     (I10) Hypertension goal BP (blood pressure) < 140/90  Comment: stable  Plan: continue self-monitor. Lipids great. Chest pain or shortness of breath to er.     (E66.01) Morbid obesity (H)  Comment: improving    (R73.9) Hyperglycemia  Comment: pre-dm  Plan: low carb diet/ some weight lifting. Recheck one year. Continue meds    (G56.00) Carpal tunnel syndrome, unspecified laterality  Comment: needs help  Plan: patient to see p.t. and consider cortisone shot.         Patient has been advised of split billing requirements and indicates understanding: Yes    COUNSELING:   Reviewed preventive health counseling, as reflected in patient instructions       Regular exercise       Healthy diet/nutrition       Vision screening       Hearing  "screening       Colorectal cancer screening       Prostate cancer screening       Osteoporosis prevention/bone health    Estimated body mass index is 35.91 kg/m  as calculated from the following:    Height as of 10/14/21: 1.854 m (6' 1\").    Weight as of 10/14/21: 123.5 kg (272 lb 3.2 oz).     Weight management plan: Discussed healthy diet and exercise guidelines    He reports that he has never smoked. He has never used smokeless tobacco.      Counseling Resources:  ATP IV Guidelines  Pooled Cohorts Equation Calculator  FRAX Risk Assessment  ICSI Preventive Guidelines  Dietary Guidelines for Americans, 2010  USDA's MyPlate  ASA Prophylaxis  Lung CA Screening    Margarito Casas MD  [facility]  "

## 2022-03-09 ASSESSMENT — ENCOUNTER SYMPTOMS
DIARRHEA: 0
FREQUENCY: 0
MYALGIAS: 0
EYE PAIN: 0
HEMATURIA: 0
NAUSEA: 0
COUGH: 0
HEMATOCHEZIA: 0
PALPITATIONS: 0
HEADACHES: 0
ABDOMINAL PAIN: 0
SHORTNESS OF BREATH: 0
NERVOUS/ANXIOUS: 0
WEAKNESS: 0
CONSTIPATION: 0
DIZZINESS: 0
FEVER: 0
ARTHRALGIAS: 0
CHILLS: 0
DYSURIA: 0
SORE THROAT: 0
PARESTHESIAS: 0
HEARTBURN: 0
JOINT SWELLING: 0

## 2022-03-10 ENCOUNTER — OFFICE VISIT (OUTPATIENT)
Dept: FAMILY MEDICINE | Facility: CLINIC | Age: 62
End: 2022-03-10
Payer: OTHER GOVERNMENT

## 2022-03-10 VITALS
HEART RATE: 68 BPM | SYSTOLIC BLOOD PRESSURE: 131 MMHG | OXYGEN SATURATION: 98 % | BODY MASS INDEX: 36.68 KG/M2 | WEIGHT: 276.8 LBS | DIASTOLIC BLOOD PRESSURE: 82 MMHG | TEMPERATURE: 98.3 F | HEIGHT: 73 IN

## 2022-03-10 DIAGNOSIS — E66.01 MORBID OBESITY (H): ICD-10-CM

## 2022-03-10 DIAGNOSIS — R73.9 HYPERGLYCEMIA: ICD-10-CM

## 2022-03-10 DIAGNOSIS — G56.00 CARPAL TUNNEL SYNDROME, UNSPECIFIED LATERALITY: ICD-10-CM

## 2022-03-10 DIAGNOSIS — I10 HYPERTENSION GOAL BP (BLOOD PRESSURE) < 140/90: ICD-10-CM

## 2022-03-10 DIAGNOSIS — Z00.00 ROUTINE GENERAL MEDICAL EXAMINATION AT A HEALTH CARE FACILITY: Primary | ICD-10-CM

## 2022-03-10 PROCEDURE — 99396 PREV VISIT EST AGE 40-64: CPT | Performed by: FAMILY MEDICINE

## 2022-03-10 PROCEDURE — 99213 OFFICE O/P EST LOW 20 MIN: CPT | Mod: 25 | Performed by: FAMILY MEDICINE

## 2022-03-10 RX ORDER — CHLORHEXIDINE GLUCONATE ORAL RINSE 1.2 MG/ML
SOLUTION DENTAL
COMMUNITY
Start: 2021-05-10

## 2022-03-10 ASSESSMENT — ENCOUNTER SYMPTOMS
DYSURIA: 0
JOINT SWELLING: 0
PALPITATIONS: 0
NAUSEA: 0
ARTHRALGIAS: 0
DIZZINESS: 0
HEADACHES: 0
ABDOMINAL PAIN: 0
HEMATURIA: 0
HEARTBURN: 0
CONSTIPATION: 0
SORE THROAT: 0
FREQUENCY: 0
DIARRHEA: 0
NERVOUS/ANXIOUS: 0
SHORTNESS OF BREATH: 0
EYE PAIN: 0
MYALGIAS: 0
HEMATOCHEZIA: 0
CHILLS: 0
PARESTHESIAS: 0
FEVER: 0
WEAKNESS: 0
COUGH: 0

## 2022-03-17 ENCOUNTER — MYC MEDICAL ADVICE (OUTPATIENT)
Dept: NEUROLOGY | Facility: CLINIC | Age: 62
End: 2022-03-17
Payer: OTHER GOVERNMENT

## 2022-03-17 DIAGNOSIS — R20.0 NUMBNESS AND TINGLING OF UPPER EXTREMITY: Primary | ICD-10-CM

## 2022-03-17 DIAGNOSIS — R20.2 NUMBNESS AND TINGLING OF UPPER EXTREMITY: Primary | ICD-10-CM

## 2022-03-17 DIAGNOSIS — M79.646 THUMB PAIN, UNSPECIFIED LATERALITY: ICD-10-CM

## 2022-04-05 NOTE — PROGRESS NOTES
Hand Therapy Initial Evaluation    Current Date:  4/6/2022  Referring Provider: Laurie Mcpherson NP MD Order Date: 3/18/2022    Diagnosis: Numbness and tingling of upper extremity (Median Nerve); Thumb pain, unspecified laterality   DOI: Sept 2021    Subjective:  Jeff Brown is a 61 year old male.    Patient reports symptoms of the right hand which occurred due to unknown causes. Since onset symptoms are Gradually getting better.  General health as reported by patient is good.  Pertinent medical history includes:Diabetes, High Blood Pressure, Numbness/Tingling, Overweight  Medical allergies:none.  Surgical history: other: tonsils.  Medication history: High Blood Pressure.    Current occupation is a  at [employer]  Job Tasks: Lifting, Carrying, Prolonged Standing    Occupational Profile Information:  Right hand dominant  Prior functional level:  independent-all household chores  Patient reports symptoms of pain, stiffness/loss of motion, weakness/loss of strength, edema, numbness and tingling   Special tests:  EMG.    Previous treatment: otc brace,  strengthening, stretching  Barriers include:none  Mobility: No difficulty  Transportation: drives  Currently working in normal job without restrictions  Leisure activities/hobbies: computer work/games    Functional Outcome Measure:   Upper Extremity Functional Index Score:  SCORE:   Column Totals: /80: 76   (A lower score indicates greater disability.)    Objective:  Pain Level (Scale 0-10):   4/6/2022   At Rest 0/10   With Use 5-7/10     Pain Description:  Date 4/6/2022   Location [facility] hand   Pain Quality Shooting   Frequency intermittent     Pain is worst  daytime or nighttime   Exacerbated by  none   Relieved by stretch and otc brace   Progression Gradually improving     Posture  Normal    Edema  Mild    Sensation  Decreased Median Nerve distribution per pt report    ROM  Pain Report: - none  + mild    ++ moderate    +++ severe   Wrist 4/6/2022  4/6/2022   AROM (PROM) L R   Extension 76 64   Flexion 72 74   RD 14 18 (1/10 pain)   UD 34 33     Special Tests  Pain Report:  - none    + mild    ++ moderate    +++ severe    4/6/2022   Median Nerve Compression at Pronator L: -  R: + @ 5 secs   Carpal Compression Test--Durkan Test (30 sec) L: -  R: + @ 20 secs   Francois Test for Lumbrical Incursion  (fist x 30 secs) L: -  R: -   Tinels at Carpal Tunnel L: -  R: +   Phalens L: + @ 28 secs  R: -     Neural Tension Testing  MNT: Median Neurodynamic Test (based on DS Juan's ULNT)   4/6/2022   0-5 Scale L: 3/5  R: 3/5   Position:   0/5: Arm across abdomen in coronal plane  1/5: Depress shoulder, ER to neutral ABD shoulder to 45 degrees  2/5: ER shoulder to end range, keep elbow at 90 degrees  3/5: Extend elbow to 0 degrees  4/5: Fully supinate forearm  5/5: Extend wrist, fingers and thumb  Notes:    (+) indicates beyond grade level but less than retirement to next level    (-) indicates over retirement to level    S1  onset/change of patient's symptoms    S2 definite stop point based on patient's discomfort level    Strength   (Measured in pounds)  Pain Report: - none  + mild    ++ moderate    +++ severe    4/6/2022 4/6/2022   Trials L R   1  2  3 84 46 (1-2/10 pain)   Average 84 46     Lat Pinch 4/6/2022 4/6/2022   Trials L R   1  2  3 18 11   Average 18 11     3 Pt Pinch 4/6/2022 4/6/2022   Trials L R   1  2  3 12 9   Average 12 9     Palpation  Client presents with clicking in both thumbs. Per palpation, tendons appears to be thickened with uneven movement with flexion and extension. Plans to obtain orders to treat thumb symptoms.     Assessment:  Patient presents with symptoms consistent with diagnosis of numbness and tingling of upper extremity, with conservative intervention.     Patient's limitations or Problem List includes:  Pain, Decreased ROM/motion, Increased edema, Weakness, Sensory disturbance and Tightness in musculature of the bilateral wrist which  interferes with the patient's ability to perform Self Care Tasks (dressing), Work Tasks and Household Chores as compared to previous level of function.    Rehab Potential:  Good - Return to full activity, some limitations    Patient will benefit from skilled Occupational Therapy to increase ROM, flexibility, motion, overall strength,  strength, pinch strength and sensation and decrease pain and edema to return to previous activity level and resume normal daily tasks and to reach their rehab potential.    Barriers to Learning:  No barrier    Communication Issues:  Patient appears to be able to clearly communicate and understand verbal and written communication and follow directions correctly.    Chart Review: Chart Review and Simple history review with patient    Identified Performance Deficits: dressing, meal preparation and cleanup and work    Assessment of Occupational Performance:  1-3 Performance Deficits    Clinical Decision Making (Complexity): Low complexity    Treatment Explanation:  The following has been discussed with the patient:  RX ordered/plan of care  Anticipated outcomes  Possible risks and side effects    Plan:  Frequency:  1 X week, once daily  Duration:  for 8 weeks    Treatment Plan:    Modalities:    US and Paraffin   Therapeutic Exercise:    AROM, AAROM, PROM, Tendon Gliding, Isotonics and Isometrics  Neuromuscular re-ed:   Nerve Gliding and Kinesiotaping  Manual Techniques:   Joint mobilization, Friction massage, Myofascial release and Manual edema mobilization  Orthotic Fabrication:    Static  Self Care:    Self Care Tasks, Ergonomic Considerations and Work Tasks    Discharge Plan:  Achieve all LTG.  Independent in home treatment program.  Reach maximal therapeutic benefit.    Home Exercise Program:  Patient will continue to wear otc brace at night and throughout the day, as needed.   Carpal Tunnel Syndrome Prevention  Warmth  Moist heat for 5 minutes before exercises  Ball Massage to  Flexors  Up to 2 minutes prior to exercises  Include carpal tunnel area  Finger Active Range of Motion Tendon Glides Fist Series  Reps 10  Sessions per day 2-3  Hold each position 5 seconds  Finger Active Range of Motion FDS Flexor Tendon Gliding  Reps 10  Sessions per day 2-3  Nerve Gliding Proximal Median  Reps 10 (hold for 5 seconds)  Sessions per day 1-2    Next Visit:  Review HEP   Discuss splinting options - wrist in neutral and thumb ring splints  MFR  Nerve gliding  US/Paraffin

## 2022-04-06 ENCOUNTER — THERAPY VISIT (OUTPATIENT)
Dept: OCCUPATIONAL THERAPY | Facility: CLINIC | Age: 62
End: 2022-04-06
Attending: NURSE PRACTITIONER
Payer: OTHER GOVERNMENT

## 2022-04-06 DIAGNOSIS — M25.639 WRIST STIFFNESS, UNSPECIFIED LATERALITY: ICD-10-CM

## 2022-04-06 DIAGNOSIS — R20.2 NUMBNESS AND TINGLING OF UPPER EXTREMITY: ICD-10-CM

## 2022-04-06 DIAGNOSIS — R20.0 NUMBNESS AND TINGLING OF UPPER EXTREMITY: ICD-10-CM

## 2022-04-06 DIAGNOSIS — M79.644 BILATERAL THUMB PAIN: ICD-10-CM

## 2022-04-06 DIAGNOSIS — M79.645 BILATERAL THUMB PAIN: ICD-10-CM

## 2022-04-06 PROCEDURE — 97165 OT EVAL LOW COMPLEX 30 MIN: CPT | Mod: GO

## 2022-04-06 PROCEDURE — 97110 THERAPEUTIC EXERCISES: CPT | Mod: GO

## 2022-04-06 PROCEDURE — 97535 SELF CARE MNGMENT TRAINING: CPT | Mod: GO

## 2022-04-08 PROBLEM — M79.644 BILATERAL THUMB PAIN: Status: ACTIVE | Noted: 2022-04-08

## 2022-04-08 PROBLEM — M79.645 BILATERAL THUMB PAIN: Status: ACTIVE | Noted: 2022-04-08

## 2022-04-13 ENCOUNTER — THERAPY VISIT (OUTPATIENT)
Dept: OCCUPATIONAL THERAPY | Facility: CLINIC | Age: 62
End: 2022-04-13
Attending: NURSE PRACTITIONER
Payer: OTHER GOVERNMENT

## 2022-04-13 DIAGNOSIS — R20.0 NUMBNESS AND TINGLING OF UPPER EXTREMITY: Primary | ICD-10-CM

## 2022-04-13 DIAGNOSIS — R20.2 NUMBNESS AND TINGLING OF UPPER EXTREMITY: Primary | ICD-10-CM

## 2022-04-13 DIAGNOSIS — M79.644 BILATERAL THUMB PAIN: ICD-10-CM

## 2022-04-13 DIAGNOSIS — M79.645 BILATERAL THUMB PAIN: ICD-10-CM

## 2022-04-13 DIAGNOSIS — M25.639 WRIST STIFFNESS, UNSPECIFIED LATERALITY: ICD-10-CM

## 2022-04-13 PROCEDURE — 97035 APP MDLTY 1+ULTRASOUND EA 15: CPT | Mod: GO | Performed by: OCCUPATIONAL THERAPIST

## 2022-04-13 PROCEDURE — 97110 THERAPEUTIC EXERCISES: CPT | Mod: GO | Performed by: OCCUPATIONAL THERAPIST

## 2022-04-13 PROCEDURE — 97760 ORTHOTIC MGMT&TRAING 1ST ENC: CPT | Mod: GO | Performed by: OCCUPATIONAL THERAPIST

## 2022-04-21 ENCOUNTER — THERAPY VISIT (OUTPATIENT)
Dept: OCCUPATIONAL THERAPY | Facility: CLINIC | Age: 62
End: 2022-04-21
Attending: NURSE PRACTITIONER
Payer: OTHER GOVERNMENT

## 2022-04-21 DIAGNOSIS — M25.639 WRIST STIFFNESS, UNSPECIFIED LATERALITY: ICD-10-CM

## 2022-04-21 DIAGNOSIS — R20.0 NUMBNESS AND TINGLING OF UPPER EXTREMITY: Primary | ICD-10-CM

## 2022-04-21 DIAGNOSIS — M79.645 BILATERAL THUMB PAIN: ICD-10-CM

## 2022-04-21 DIAGNOSIS — M79.644 BILATERAL THUMB PAIN: ICD-10-CM

## 2022-04-21 DIAGNOSIS — R20.2 NUMBNESS AND TINGLING OF UPPER EXTREMITY: Primary | ICD-10-CM

## 2022-04-21 PROCEDURE — 97110 THERAPEUTIC EXERCISES: CPT | Mod: GO

## 2022-04-21 PROCEDURE — 97112 NEUROMUSCULAR REEDUCATION: CPT | Mod: GO

## 2022-04-21 PROCEDURE — 97140 MANUAL THERAPY 1/> REGIONS: CPT | Mod: GO

## 2022-04-28 ENCOUNTER — THERAPY VISIT (OUTPATIENT)
Dept: OCCUPATIONAL THERAPY | Facility: CLINIC | Age: 62
End: 2022-04-28
Payer: OTHER GOVERNMENT

## 2022-04-28 DIAGNOSIS — M79.644 BILATERAL THUMB PAIN: ICD-10-CM

## 2022-04-28 DIAGNOSIS — M79.645 BILATERAL THUMB PAIN: ICD-10-CM

## 2022-04-28 DIAGNOSIS — M25.639 WRIST STIFFNESS, UNSPECIFIED LATERALITY: ICD-10-CM

## 2022-04-28 DIAGNOSIS — R20.0 NUMBNESS AND TINGLING OF UPPER EXTREMITY: Primary | ICD-10-CM

## 2022-04-28 DIAGNOSIS — R20.2 NUMBNESS AND TINGLING OF UPPER EXTREMITY: Primary | ICD-10-CM

## 2022-04-28 PROCEDURE — 97112 NEUROMUSCULAR REEDUCATION: CPT | Mod: GO

## 2022-04-28 PROCEDURE — 97140 MANUAL THERAPY 1/> REGIONS: CPT | Mod: GO

## 2022-04-28 PROCEDURE — 97763 ORTHC/PROSTC MGMT SBSQ ENC: CPT | Mod: GO

## 2022-05-04 ENCOUNTER — THERAPY VISIT (OUTPATIENT)
Dept: OCCUPATIONAL THERAPY | Facility: CLINIC | Age: 62
End: 2022-05-04
Payer: OTHER GOVERNMENT

## 2022-05-04 DIAGNOSIS — M25.639 WRIST STIFFNESS, UNSPECIFIED LATERALITY: ICD-10-CM

## 2022-05-04 DIAGNOSIS — R20.0 NUMBNESS AND TINGLING OF UPPER EXTREMITY: Primary | ICD-10-CM

## 2022-05-04 DIAGNOSIS — R20.2 NUMBNESS AND TINGLING OF UPPER EXTREMITY: Primary | ICD-10-CM

## 2022-05-04 DIAGNOSIS — M79.644 BILATERAL THUMB PAIN: ICD-10-CM

## 2022-05-04 DIAGNOSIS — M79.645 BILATERAL THUMB PAIN: ICD-10-CM

## 2022-05-04 PROCEDURE — 97140 MANUAL THERAPY 1/> REGIONS: CPT | Mod: GO | Performed by: OCCUPATIONAL THERAPIST

## 2022-05-04 PROCEDURE — 97110 THERAPEUTIC EXERCISES: CPT | Mod: GO | Performed by: OCCUPATIONAL THERAPIST

## 2022-05-04 NOTE — PROGRESS NOTES
Hand Therapy Progress Note    Current Date:  5/4/2022  Reporting period is 4/8/22 to 5/4/2022  Referring Provider: Laurie Mcpherson NP    Diagnosis: Numbness and tingling of upper extremity (Median Nerve); Thumb pain, (Trigger thumbs) unspecified laterality   DOI: Sept 2021    Jeff Brown is a 61 year old male.    Subjective:   Subjective changes noted by patient:  The thumbs are better than a few days ago.  The splints were not working out so I bought some different ones.  I also bought some wrist braces for the carpal tunnel  The swelling has gone down since wearing the new braces.  Last night I slept without the braces and was a little stiff in the morning  Functional changes noted by patient:  Improvement in Self Care Tasks (dressing)  Patient has noted adverse reaction to:  None    Upper Extremity Functional Index Score:  SCORE:   Column Totals: /80: 74   (A lower score indicates greater disability.)    Objective:  Pain Level (Scale 0-10):   4/6/2022 5/4/21   At Rest 0/10 0/10   With Use 5-7/10 4-5/10     Pain Description:  Date 4/6/2022   Location [facility]   Pain Quality Shooting   Frequency intermittent     Pain is worst  daytime or nighttime   Exacerbated by  none   Relieved by stretch and otc brace   Progression Gradually improving       Edema  Mild    Sensation  Decreased Median Nerve distribution per pt report    ROM  Pain Report: - none  + mild    ++ moderate    +++ severe   Wrist 4/6/2022 4/6/2022   AROM (PROM) L R   Extension 76 64   Flexion 72 74   RD 14 18 (1/10 pain)   UD 34 33     Special Tests  Pain Report:  - none    + mild    ++ moderate    +++ severe    4/6/2022   Median Nerve Compression at Pronator L: -  R: + @ 5 secs   Carpal Compression Test--Durkan Test (30 sec) L: -  R: + @ 20 secs   Francois Test for Lumbrical Incursion  (fist x 30 secs) L: -  R: -   Tinels at Carpal Tunnel L: -  R: +   Phalens L: + @ 28 secs  R: -     Neural Tension Testing  MNT: Median Neurodynamic Test  (based on DS Juan's ULNT)   4/6/2022   0-5 Scale L: 3/5  R: 3/5   Position:   0/5: Arm across abdomen in coronal plane  1/5: Depress shoulder, ER to neutral ABD shoulder to 45 degrees  2/5: ER shoulder to end range, keep elbow at 90 degrees  3/5: Extend elbow to 0 degrees  4/5: Fully supinate forearm  5/5: Extend wrist, fingers and thumb  Notes:    (+) indicates beyond grade level but less than penitentiary to next level    (-) indicates over penitentiary to level    S1  onset/change of patient's symptoms    S2 definite stop point based on patient's discomfort level    Strength   (Measured in pounds)  Pain Report: - none  + mild    ++ moderate    +++ severe    4/6/2022 4/6/2022 5/4/22 5/4/22   Trials L R L R   1  2  3 84 46 (1-2/10 pain) 69 55   Average 84 46 69 55     Lat Pinch 4/6/2022 4/6/2022 5/4/22   Trials L R R   1  2  3 18 11 12+   Average 18 11      3 Pt Pinch 4/6/2022 4/6/2022 5/4/22   Trials L R R   1  2  3 12 9 10   Average 12 9        Thumb 5/4/2022 5/4/2022   AROM  (PROM) Right Left   MP /60 /60   IP /65 /65   RABD     PABD       Stage of Stenosing Tenosynovitis (SST)      5/4/2022   Right thumb 3   Left thumb 3   Stage 1:  Normal  Stage 2:  Uneven motion of tendon  Stage 3:  Triggering, clicking, catching  Stage 4:  Locking in extension or flexion; unlocked by active motion  Stage 5:  Locking in extension or flexion; unlocked by passive motion  Stage 6:  Finger locked in extension or flexion    Palpation  Pain Report:  - none  + mild    ++ moderate    +++ severe   thumbs 5/4/2022 5/4/22    R L   A1 Pulley + ++   PIP + +   CMC - -   FA Flexors     FA Extensors       Please refer to the daily flowsheet for treatment provided today.     Assessment:   Response to therapy has been improvement to:  Flexibility:  tendon gliding is improved  Pain:  frequency is less, intensity of pain is decreased, duration of pain is decreased and less tender over affected area    Overall Assessment:  Patient's symptoms are  resolving.  Patient is becoming more independent in home exercise program  Patient would benefit from continued therapy to achieve rehab potential  STG/LTG:  STGoals have been reviewed and progress or achievement has occurred;  see goal sheet for details and updates.    I have re-evaluated this patient and find that the nature, scope, duration and intensity of the therapy is appropriate for the medical condition of the patient.  Plan:  Frequency/Duration:  Recommend continuing with the current treatment plan. 1 X week, once daily  for 3 weeks    Home Exercise Program:  Exercise Name: Carpal Tunnel Syndrome Prevention  Exercise Name: Warmth - Sessions: Moist heat for 5 minutes before exercises  Exercise Name: Ball Massage to Flexors - Sessions: Up to 2 minutes prior to exercises, Notes: Include carpal tunnel area  Exercise Name: Finger Active Range of Motion Tendon Glides Fist Series - Reps: 10 - Sessions: 2-3, Notes: hold each position 5 seconds  Exercise Name: Finger Active Range of Motion FDS Flexor Tendon Gliding - Reps: 10 - Sessions: 2-3  Exercise Name: Nerve Gliding Proximal Median - Reps: 10 (hold for 5 seconds) - Sessions: 1-2  Exercise Name: Thumb Passive Range of Motion IP Joint Flexion, Sets: 3x/day - Reps: 5, Notes: hold 10-20 seconds  Exercise Name: Thumb Passive Range of Motion MP Joint Flexion, Sets: 3x/day - Reps: 5, Notes: hold 10-20 seconds  Exercise Name: Thumb Passive Range of Motion Composite Flexion, Sets: 3x/day - Reps: 5, Notes: hold 10-20 seconds  Exercise Name: Thumb Passive Range of Motion Composite Extension, Sets: 3x/day - Reps: 5, Notes: hold 10-20 seconds  Exercise Name: Friction Massage  Exercise Name: [place] Massage to Thumb    Next Visit:  Check orthoses and adjust as indicated  Review HEP   MFR  Nerve gliding  US/Paraffin

## 2022-05-18 ENCOUNTER — THERAPY VISIT (OUTPATIENT)
Dept: OCCUPATIONAL THERAPY | Facility: CLINIC | Age: 62
End: 2022-05-18
Payer: OTHER GOVERNMENT

## 2022-05-18 DIAGNOSIS — R20.0 NUMBNESS AND TINGLING OF UPPER EXTREMITY: Primary | ICD-10-CM

## 2022-05-18 DIAGNOSIS — M79.644 BILATERAL THUMB PAIN: ICD-10-CM

## 2022-05-18 DIAGNOSIS — R20.2 NUMBNESS AND TINGLING OF UPPER EXTREMITY: Primary | ICD-10-CM

## 2022-05-18 DIAGNOSIS — M25.639 WRIST STIFFNESS, UNSPECIFIED LATERALITY: ICD-10-CM

## 2022-05-18 DIAGNOSIS — M79.645 BILATERAL THUMB PAIN: ICD-10-CM

## 2022-05-18 PROCEDURE — 97140 MANUAL THERAPY 1/> REGIONS: CPT | Mod: GO | Performed by: OCCUPATIONAL THERAPIST

## 2022-05-18 PROCEDURE — 97110 THERAPEUTIC EXERCISES: CPT | Mod: GO | Performed by: OCCUPATIONAL THERAPIST

## 2022-05-18 NOTE — PROGRESS NOTES
Hand Therapy SOAP Note    Current Date:  5/18/2022    Referring Provider: Laurie Mcpherson NP    Diagnosis: Numbness and tingling of upper extremity (Median Nerve); Thumb pain, (Trigger thumbs) unspecified laterality   DOI: Sept 2021    Jeff Brown is a 61 year old male.    Subjective: The hands are pretty good even if I wear the wrist braces or not. Getting better as time goes on. I am working on the thumb channels. Patient is noticing less popping at work. However it locks in place for a second randomly    Objective:  Pain Level (Scale 0-10):   4/6/2022 5/4/21 5/18/22   At Rest 0/10 0/10 0/10   With Use 5-7/10 4-5/10 4/5     Pain Description:  Date 4/6/2022   [facility]   Pain Quality Shooting   Frequency intermittent     Pain is worst  daytime or nighttime   Exacerbated by  none   Relieved by stretch and otc brace   Progression Gradually improving       Edema  Mild    Sensation  Decreased Median Nerve distribution per pt report    ROM  Pain Report: - none  + mild    ++ moderate    +++ severe   Wrist 4/6/2022 4/6/2022   AROM (PROM) L R   Extension 76 64   Flexion 72 74   RD 14 18 (1/10 pain)   UD 34 33     Special Tests  Pain Report:  - none    + mild    ++ moderate    +++ severe    4/6/2022   Median Nerve Compression at Pronator L: -  R: + @ 5 secs   Carpal Compression Test--Durkan Test (30 sec) L: -  R: + @ 20 secs   Francois Test for Lumbrical Incursion  (fist x 30 secs) L: -  R: -   Tinels at Carpal Tunnel L: -  R: +   Phalens L: + @ 28 secs  R: -     Neural Tension Testing  MNT: Median Neurodynamic Test (based on DS Juan's ULNT)   4/6/2022   0-5 Scale L: 3/5  R: 3/5   Position:   0/5: Arm across abdomen in coronal plane  1/5: Depress shoulder, ER to neutral ABD shoulder to 45 degrees  2/5: ER shoulder to end range, keep elbow at 90 degrees  3/5: Extend elbow to 0 degrees  4/5: Fully supinate forearm  5/5: Extend wrist, fingers and thumb  Notes:    (+) indicates beyond grade level but less  than prison to next level    (-) indicates over prison to level    S1  onset/change of patient's symptoms    S2 definite stop point based on patient's discomfort level    Strength   (Measured in pounds)  Pain Report: - none  + mild    ++ moderate    +++ severe    4/6/2022 4/6/2022 5/4/22 5/4/22   Trials L R L R   1  2  3 84 46 (1-2/10 pain) 69 55   Average 84 46 69 55     Lat Pinch 4/6/2022 4/6/2022 5/4/22   Trials L R R   1  2  3 18 11 12+   Average 18 11      3 Pt Pinch 4/6/2022 4/6/2022 5/4/22   Trials L R R   1  2  3 12 9 10   Average 12 9        Thumb 5/4/2022 5/4/2022   AROM  (PROM) Right Left   MP /60 /60   IP /65 /65   RABD     PABD       Stage of Stenosing Tenosynovitis (SST)      5/4/2022 5/18/22   Right thumb 3 3   Left thumb 3 3   Stage 1:  Normal  Stage 2:  Uneven motion of tendon  Stage 3:  Triggering, clicking, catching  Stage 4:  Locking in extension or flexion; unlocked by active motion  Stage 5:  Locking in extension or flexion; unlocked by passive motion  Stage 6:  Finger locked in extension or flexion    Palpation  Pain Report:  - none  + mild    ++ moderate    +++ severe   thumbs 5/4/2022 5/4/22 5/18/22 5/18/22    R L R L   A1 Pulley + ++ + ++   PIP + +     CMC - -     FA Flexors       FA Extensors         Please refer to the daily flowsheet for treatment provided today.     Home Exercise Program:  Exercise Name: Carpal Tunnel Syndrome Prevention  Exercise Name: Warmth - Sessions: Moist heat for 5 minutes before exercises  Exercise Name: Ball Massage to Flexors - Sessions: Up to 2 minutes prior to exercises, Notes: Include carpal tunnel area  Exercise Name: Finger Active Range of Motion Tendon Glides Fist Series - Reps: 10 - Sessions: 2-3, Notes: hold each position 5 seconds  Exercise Name: Finger Active Range of Motion FDS Flexor Tendon Gliding - Reps: 10 - Sessions: 2-3  Exercise Name: Nerve Gliding Proximal Median - Reps: 10 (hold for 5 seconds) - Sessions: 1-2  Exercise Name: Thumb  Passive Range of Motion IP Joint Flexion, Sets: 3x/day - Reps: 5, Notes: hold 10-20 seconds  Exercise Name: Thumb Passive Range of Motion MP Joint Flexion, Sets: 3x/day - Reps: 5, Notes: hold 10-20 seconds  Exercise Name: Thumb Passive Range of Motion Composite Flexion, Sets: 3x/day - Reps: 5, Notes: hold 10-20 seconds  Exercise Name: Thumb Passive Range of Motion Composite Extension, Sets: 3x/day - Reps: 5, Notes: hold 10-20 seconds  Exercise Name: Friction Massage  Exercise Name: [place] Massage to Thumb    Next Visit:  Check orthoses and adjust as indicated  Review HEP   MFR  Nerve gliding  US/Paraffin

## 2022-05-31 ENCOUNTER — E-VISIT (OUTPATIENT)
Dept: FAMILY MEDICINE | Facility: CLINIC | Age: 62
End: 2022-05-31
Payer: OTHER GOVERNMENT

## 2022-05-31 DIAGNOSIS — Z20.822 SUSPECTED COVID-19 VIRUS INFECTION: Primary | ICD-10-CM

## 2022-05-31 PROCEDURE — 99421 OL DIG E/M SVC 5-10 MIN: CPT | Performed by: FAMILY MEDICINE

## 2022-05-31 NOTE — PATIENT INSTRUCTIONS
Dear Jeff,      Based on your responses, you may have coronavirus (COVID-19).     Will I be tested for COVID-19?  We would like to test you for COVID. I have placed orders for this test.     To schedule: go to your [facility] home page and scroll down to the section that says  You have an appointment that needs to be scheduled  and click the large green button that says  Schedule Now  and follow the steps to find the next available openings.    If you are unable to complete these [facility] scheduling steps, please call [phone] to schedule your testing.     These guidelines are for isolating before returning to work, school or .     For employers, schools and day cares: This is an official notice for this person s medical guidelines for returning in-person.     For health care sites: The CDC gives different isolation and quarantine guidelines for healthcare sites, please check with these sites before arriving.     How do I self-isolate?  You isolate when you have symptoms of COVID or a test shows you have COVID, even if you don t have symptoms.     If you DO have symptoms:  o Stay home and away from others  - For at least 5 days after your symptoms started, AND   - You are fever free for 24 hours (with no medicine that reduces fever), AND  - Your other symptoms are better.  o Wear a mask for 10 full days any time you are around others.    If you DON T have symptoms:  o Stay at home and away from others for at least 5 days after your positive test.  o Wear a mask for 10 full days any time you are around others.    How can I take care of myself?  Over the counter medications may help with your symptoms such as runny or stuffy nose, cough, chills, or fever.  Talk to your care team about your options.     Some people are at high risk of severe illness (for example, you have a weak immune system, you re 65 years or older, or you have certain medical problems). If your risk is high and your symptoms started in the  last 5 to 7 days, we strongly recommend for you to get COVID treatment as soon as possible. Paxlovid, Molnupiravir and the monoclonal antibody treatments are proven safe and effective, make you feel better faster, and prevent hospitalization and death.       To schedule an appointment to discuss COVID treatment, request an appointment on MyChart (select  COVID-19 Treatment ) or call [phone] ([phone]), press 7.      Get lots of rest. Drink extra fluids (unless a doctor has told you not to)    Take Tylenol (acetaminophen) or ibuprofen for fever or pain. If you have liver or kidney problems, ask your family doctor if it's okay to take Tylenol or ibuprofen    Take over the counter medications for your symptoms, as directed by your doctor. You may also talk to your pharmacist.      If you have other health problems (like cancer, heart failure, an organ transplant or severe kidney disease): Call your specialty clinic if you don't feel better in the next 2 days.    Know when to call 911. Emergency warning signs include:  o Trouble breathing or shortness of breath  o Pain or pressure in the chest that doesn't go away  o Feeling confused like you haven't felt before, or not being able to wake up  o Bluish-colored lips or face    Where can I get more information?    [facility] - About COVID-19: [email or web address]     CDC - What to Do If You're Sick: https://www.cdc.gov/coronavirus/2019-ncov/if-you-are-sick/index.html     CDC - Quarantine & Isolation: https://www.cdc.gov/coronavirus/2019-ncov/your-health/quarantine-isolation.html     [facility] clinical trials (COVID-19 research studies): [email or web address]    Below are the COVID-19 hotlines at the [facility] of Health ([facility]). Interpreters are available.  o For health questions: Call 807-228-8028 or [phone] (7 a.m. to 7 p.m.)  o For questions about schools and childcare: Call [phone] or  [phone] (7 a.m. to 7 p.m.)

## 2022-06-01 ENCOUNTER — LAB (OUTPATIENT)
Dept: URGENT CARE | Facility: URGENT CARE | Age: 62
End: 2022-06-01
Attending: FAMILY MEDICINE
Payer: OTHER GOVERNMENT

## 2022-06-01 DIAGNOSIS — Z20.822 SUSPECTED COVID-19 VIRUS INFECTION: ICD-10-CM

## 2022-06-01 LAB — SARS-COV-2 RNA RESP QL NAA+PROBE: POSITIVE

## 2022-06-01 PROCEDURE — U0003 INFECTIOUS AGENT DETECTION BY NUCLEIC ACID (DNA OR RNA); SEVERE ACUTE RESPIRATORY SYNDROME CORONAVIRUS 2 (SARS-COV-2) (CORONAVIRUS DISEASE [COVID-19]), AMPLIFIED PROBE TECHNIQUE, MAKING USE OF HIGH THROUGHPUT TECHNOLOGIES AS DESCRIBED BY CMS-2020-01-R: HCPCS

## 2022-06-01 PROCEDURE — U0005 INFEC AGEN DETEC AMPLI PROBE: HCPCS

## 2022-06-02 ENCOUNTER — VIRTUAL VISIT (OUTPATIENT)
Dept: FAMILY MEDICINE | Facility: CLINIC | Age: 62
End: 2022-06-02

## 2022-06-02 ENCOUNTER — TELEPHONE (OUTPATIENT)
Dept: NURSING | Facility: CLINIC | Age: 62
End: 2022-06-02

## 2022-06-02 DIAGNOSIS — U07.1 INFECTION DUE TO 2019 NOVEL CORONAVIRUS: Primary | ICD-10-CM

## 2022-06-02 PROCEDURE — 99442 PR PHYSICIAN TELEPHONE EVALUATION 11-20 MIN: CPT | Mod: 95 | Performed by: FAMILY MEDICINE

## 2022-06-02 NOTE — PROGRESS NOTES
Jeff is a 61 year old who is being evaluated via a billable telephone visit.      What phone number would you like to be contacted at? [phone]  How would you like to obtain your AVS? [facility]    ASSESSMENT / PLAN:  (U07.1) Infection due to 2019 novel coronavirus  (primary encounter diagnosis)  Comment: improving  Plan: discussed treatment with anti-viral and patient declined. Improving overall and no major lung issues in past.   Expected course and warning signs reviewed. Worsening shortness of breath/ fatigue or chest pain or leg swelling to ER. Advised to closely monitor breathing and oxygen stats. Rest and keep well hydrated. Expected course and warning signs reviewed. Call/email with questions/concerns         Subjective   Jeff is a 61 year old who presents for the following health issues  +covid testing. 3-4 days ago - fevers 101.6 - this 98.8 am. No pain med or tylenol.   Urine ok. Coughing. No chest pain. No shortness of breath. No leg swelling/pain. No history dvt.   Had covid series and booster. No covid in past known.   Never smoked. No history asthma. No major marcus noted in past. Good exercise tolerance.   Normal lipids in past. No history dm. History obesity and htn.   HPI     Discuss positive covid19 results. Positive yesterday.        Review of Systems         Objective           Vitals:  No vitals were obtained today due to virtual visit.    Physical Exam   healthy, alert and no distress  PSYCH: Alert and oriented times 3; coherent speech, normal   rate and volume, able to articulate logical thoughts, able   to abstract reason, no tangential thoughts, no hallucinations   or delusions  His affect is normal  RESP: No cough, no audible wheezing, able to talk in full sentences  Remainder of exam unable to be completed due to telephone visits            Phone call duration: 12 minutes

## 2022-06-02 NOTE — TELEPHONE ENCOUNTER
Patient classified as COVID treatment eligible by Epic high risk algorithm:  Yes    Coronavirus (COVID-19) Notification    Reason for call  Notify of POSITIVE COVID-19 lab result, assess symptoms,  review [facility] recommendations    Lab Result   Lab test for 2019-nCoV rRt-PCR or SARS-COV-2 PCR  Oropharyngeal AND/OR nasopharyngeal swabs were POSITIVE for 2019-nCoV RNA [OR] SARS-COV-2 RNA (COVID-19) RNA     We have been unable to reach patient by phone at this time to notify of their Positive COVID-19 result.    UNABLE TO LEAVE A MESSAGE   A Positive COVID-19 letter will be sent via [employer] or the mail. (Exception, no letters sent to Presurgerical/Preprocedure Patients)    Jeanna Maloney

## 2022-06-02 NOTE — LETTER
[facility]  [address] [facility] [address]  Phone: [phone]    June 2, 2022        [place]  [address]  [place] [facility] [place] [facility] [address]          To whom it may concern:    RE: Jeff Brown    Patient was seen and treated today at our clinic and missed work until 6/6/2022 because of covid. Symptoms started 5/30/2022.   Able to return to work on 6/6/2022.  Please contact me for questions or concerns.      Sincerely,        Margarito Casas MD

## 2022-06-16 ENCOUNTER — THERAPY VISIT (OUTPATIENT)
Dept: OCCUPATIONAL THERAPY | Facility: CLINIC | Age: 62
End: 2022-06-16
Payer: OTHER GOVERNMENT

## 2022-06-16 DIAGNOSIS — M79.644 BILATERAL THUMB PAIN: ICD-10-CM

## 2022-06-16 DIAGNOSIS — M79.645 BILATERAL THUMB PAIN: ICD-10-CM

## 2022-06-16 DIAGNOSIS — M25.639 WRIST STIFFNESS, UNSPECIFIED LATERALITY: ICD-10-CM

## 2022-06-16 DIAGNOSIS — R20.0 NUMBNESS AND TINGLING OF UPPER EXTREMITY: Primary | ICD-10-CM

## 2022-06-16 DIAGNOSIS — R20.2 NUMBNESS AND TINGLING OF UPPER EXTREMITY: Primary | ICD-10-CM

## 2022-06-16 PROCEDURE — 97035 APP MDLTY 1+ULTRASOUND EA 15: CPT | Mod: GO

## 2022-06-16 PROCEDURE — 97763 ORTHC/PROSTC MGMT SBSQ ENC: CPT | Mod: GO

## 2022-06-30 ENCOUNTER — THERAPY VISIT (OUTPATIENT)
Dept: OCCUPATIONAL THERAPY | Facility: CLINIC | Age: 62
End: 2022-06-30
Payer: OTHER GOVERNMENT

## 2022-06-30 DIAGNOSIS — R20.0 NUMBNESS AND TINGLING OF UPPER EXTREMITY: Primary | ICD-10-CM

## 2022-06-30 DIAGNOSIS — M79.644 BILATERAL THUMB PAIN: ICD-10-CM

## 2022-06-30 DIAGNOSIS — R20.2 NUMBNESS AND TINGLING OF UPPER EXTREMITY: Primary | ICD-10-CM

## 2022-06-30 DIAGNOSIS — M79.645 BILATERAL THUMB PAIN: ICD-10-CM

## 2022-06-30 DIAGNOSIS — M25.639 WRIST STIFFNESS, UNSPECIFIED LATERALITY: ICD-10-CM

## 2022-06-30 PROCEDURE — 97110 THERAPEUTIC EXERCISES: CPT | Mod: GO

## 2022-06-30 PROCEDURE — 97035 APP MDLTY 1+ULTRASOUND EA 15: CPT | Mod: GO

## 2022-06-30 NOTE — PROGRESS NOTES
Hand Therapy Progress Note    Current Date:  6/30/2022  Referring Provider: Laurie Mcpherson NP    Diagnosis: Numbness and tingling of upper extremity (Median Nerve); Thumb pain, (Trigger thumbs) unspecified laterality   DOI: Sept 2021    Reporting period is 5/4/2022 to 6/30/2022    Subjective:   Subjective changes noted by patient:  As for the thumbs, they are better. The carpal tunnel on the right seem to be making a come back. I do not wear the thumb braces at work because I have to wear gloves and they get hot and sweaty.  Functional changes noted by patient:  Improvement in Self Care Tasks (dressing, hygiene/toileting), Work Tasks, Sleep Patterns and Recreational Activities  Patient has noted adverse reaction to:  None    Functional Outcome Measure:  Upper Extremity Functional Index Score:  SCORE:   Column Totals: /80: 79   (A lower score indicates greater disability.)    Objective:  Pain Level (Scale 0-10):   4/6/2022 5/4/21 6/30/2022   At Rest 0/10 0/10 0/10   With Use 5-7/10 4-5/10 Thumbs: 0/10  Wrists: 1-2/10     Pain Description:  Date 4/6/2022   Location [facility]   Pain Quality Shooting   Frequency intermittent     Pain is worst  daytime or nighttime   Exacerbated by  none   Relieved by stretch and otc brace   Progression Gradually improving       Edema  Mild    Sensation  Decreased Median Nerve distribution per pt report    ROM  Pain Report: - none  + mild    ++ moderate    +++ severe   Wrist 4/6/2022 4/6/2022 6/30/2022   AROM (PROM) L R R   Extension 76 64 70   Flexion 72 74 76   RD 14 18 (1/10 pain) 18   UD 34 33 39     Special Tests  Pain Report:  - none    + mild    ++ moderate    +++ severe    4/6/2022 6/30/2022   Median Nerve Compression at Pronator L: -  R: + @ 5 secs R: + @ 27 secs   Carpal Compression Test--Durkan Test (30 sec) L: -  R: + @ 20 secs R: + @ 25 secs   Francois Test for Lumbrical Incursion  (fist x 30 secs) L: -  R: -    Tinels at Carpal Tunnel L: -  R: + R: -   Phalens L: + @  28 secs  R: - L: + @ 7 secs     Neural Tension Testing  MNT: Median Neurodynamic Test (based on DS Martinez's ULNT)   4/6/2022 6/30/2022   0-5 Scale L: 3/5  R: 3/5 L: 4/5  R: 4/5   Position:   0/5: Arm across abdomen in coronal plane  1/5: Depress shoulder, ER to neutral ABD shoulder to 45 degrees  2/5: ER shoulder to end range, keep elbow at 90 degrees  3/5: Extend elbow to 0 degrees  4/5: Fully supinate forearm  5/5: Extend wrist, fingers and thumb  Notes:    (+) indicates beyond grade level but less than [facility] to next level    (-) indicates over [facility] to level    S1  onset/change of patient's symptoms    S2 definite stop point based on patient's discomfort level    Strength   (Measured in pounds)  Pain Report: - none  + mild    ++ moderate    +++ severe    4/6/2022 4/6/2022 5/4/22 5/4/22 6/30/2022 6/30/2022   Trials L R L R L R   1  2  3 84 46 (1-2/10 pain) 69 55 79 68   Average 84 46 69 55 79 68     Lat Pinch 4/6/2022 4/6/2022 5/4/22 6/30/2022   Trials L R R R   1  2  3 18 11 12+ 18 +   Average 18 11  18     3 Pt Pinch 4/6/2022 4/6/2022 5/4/22 6/30/2022   Trials L R R R   1  2  3 12 9 10 15+   Average 12 9  15       Thumb 5/4/2022 5/4/2022   AROM  (PROM) Right Left   MP /60 /60   IP /65 /65   RABD     PABD       Stage of Stenosing Tenosynovitis (SST)     5/4/2022 6/30/2022   Right thumb 3 2-3   Left thumb 3 3   Stage 1:  Normal  Stage 2:  Uneven motion of tendon  Stage 3:  Triggering, clicking, catching  Stage 4:  Locking in extension or flexion; unlocked by active motion  Stage 5:  Locking in extension or flexion; unlocked by passive motion  Stage 6:  Finger locked in extension or flexion    Palpation  Pain Report:  - none  + mild    ++ moderate    +++ severe   thumbs 5/4/2022 5/4/22 6/30/2022 6/30/2022    R L R L   A1 Pulley + ++ + +++   PIP + + - -   CMC - -     FA Flexors       FA Extensors         Please refer to the daily flowsheet for treatment provided today.     Assessment:  Response to therapy  has been improvement to:  ROM of Wrist:  Ext , Flex and Ulnar Deviation  Strength:   and pinch  Pain:  frequency is less and intensity of pain is decreased  Paresthesias:  Median nerve - less intense numbness and tingling  Response to therapy has been lack of progress in:  Triggering and tendon gliding of both thumbs    Overall Assessment:  Patient's symptoms are resolving.  Patient is progressing well and is ready to decrease frequency of treatment in the clinic.  Patient is becoming more independent in home exercise program  Patient would benefit from continued therapy to achieve rehab potential  Patient would benefit from further evaluation of triggering thumbs by MD.  STG/LTG:  STGoals have been reviewed and progress or achievement has occurred;  see goal sheet for details and updates.    Plan:  Frequency/Duration:  Recommend continuing to see patient  2 X a month, once daily  for 1 months  Appropriateness of Rx I have re-evaluated this patient and find that the nature, scope, duration and intensity of the therapy is appropriate for the medical condition of the patient.    Treatment Plan:  Modalities:    US and Paraffin   Therapeutic Exercise:    AROM, AAROM, PROM, Tendon Gliding, Isotonics and Isometrics  Neuromuscular re-ed:   Nerve Gliding and Kinesiotaping  Manual Techniques:   Joint mobilization, Friction massage, Myofascial release and Manual edema mobilization  Orthotic Fabrication:    Static  Self Care:    Self Care Tasks, Ergonomic Considerations and Work Tasks    Home Exercise Program:  Carpal Tunnel Syndrome Prevention  Warmth  Moist heat for 5 minutes before exercises  Ball Massage to Flexors  Up to 2 minutes prior to exercises  Include carpal tunnel area  Finger Active Range of Motion Tendon Glides Fist Series  Reps 10  Sessions per day 2-3  Notes hold each position 5 seconds  Finger Active Range of Motion FDS Flexor Tendon Gliding  Reps 10  Sessions per day 2-3  Nerve Gliding Proximal  Median  Reps 10 (hold for 5 seconds)  Sessions per day 1-2  Thumb Passive Range of Motion IP Joint Flexion  HEP - Sets 3x/day  Reps 5  Notes hold 10-20 seconds  Thumb Passive Range of Motion MP Joint Flexion  HEP - Sets 3x/day  Reps 5  Notes hold 10-20 seconds  Thumb Passive Range of Motion Composite Flexion  HEP - Sets 3x/day  Reps 5  Notes hold 10-20 seconds  Thumb Passive Range of Motion Composite Extension  HEP - Sets 3x/day  Reps 5  Notes hold 10-20 seconds  Friction Massage  [place] Massage to Thumb    Next Visit:  Review HEP  MFR to flexors  Median nerve gliding  A/AA/PROM

## 2022-08-04 ENCOUNTER — THERAPY VISIT (OUTPATIENT)
Dept: OCCUPATIONAL THERAPY | Facility: CLINIC | Age: 62
End: 2022-08-04
Payer: OTHER GOVERNMENT

## 2022-08-04 DIAGNOSIS — M79.645 BILATERAL THUMB PAIN: ICD-10-CM

## 2022-08-04 DIAGNOSIS — M79.644 BILATERAL THUMB PAIN: ICD-10-CM

## 2022-08-04 DIAGNOSIS — R20.0 NUMBNESS AND TINGLING OF UPPER EXTREMITY: Primary | ICD-10-CM

## 2022-08-04 DIAGNOSIS — R20.2 NUMBNESS AND TINGLING OF UPPER EXTREMITY: Primary | ICD-10-CM

## 2022-08-04 DIAGNOSIS — M25.639 WRIST STIFFNESS, UNSPECIFIED LATERALITY: ICD-10-CM

## 2022-08-04 PROCEDURE — 97035 APP MDLTY 1+ULTRASOUND EA 15: CPT | Mod: GO

## 2022-08-04 PROCEDURE — 97112 NEUROMUSCULAR REEDUCATION: CPT | Mod: GO

## 2022-08-04 PROCEDURE — 97140 MANUAL THERAPY 1/> REGIONS: CPT | Mod: GO

## 2022-08-25 ENCOUNTER — THERAPY VISIT (OUTPATIENT)
Dept: OCCUPATIONAL THERAPY | Facility: CLINIC | Age: 62
End: 2022-08-25
Payer: OTHER GOVERNMENT

## 2022-08-25 DIAGNOSIS — R20.2 NUMBNESS AND TINGLING OF UPPER EXTREMITY: Primary | ICD-10-CM

## 2022-08-25 DIAGNOSIS — M79.645 BILATERAL THUMB PAIN: ICD-10-CM

## 2022-08-25 DIAGNOSIS — M79.644 BILATERAL THUMB PAIN: ICD-10-CM

## 2022-08-25 DIAGNOSIS — R20.0 NUMBNESS AND TINGLING OF UPPER EXTREMITY: Primary | ICD-10-CM

## 2022-08-25 DIAGNOSIS — M25.639 WRIST STIFFNESS, UNSPECIFIED LATERALITY: ICD-10-CM

## 2022-08-25 PROCEDURE — 97112 NEUROMUSCULAR REEDUCATION: CPT | Mod: GO

## 2022-08-25 PROCEDURE — 97140 MANUAL THERAPY 1/> REGIONS: CPT | Mod: GO

## 2022-08-25 PROCEDURE — 97110 THERAPEUTIC EXERCISES: CPT | Mod: GO

## 2022-08-25 NOTE — PROGRESS NOTES
Hand Therapy Discharge Note    Current Date:  8/25/2022  Referring Provider: Laurie Mcpherson NP    Diagnosis: Numbness and tingling of upper extremity (Median Nerve); Thumb pain, (Trigger thumbs) unspecified laterality   DOI: Sept 2021    Reporting period is 6/30/2022 to 8/25/2022    Subjective:   Subjective changes noted by patient:  I'm wearing the braces at night. I wake up in the morning with stiffness, but it goes away after I use it. I had one instance where I woke up at night and my right arm had a cramp right here (flexor wad). The thumbs are fine. Around 4 o'clock, I wake up with symptoms on my right hand.  Functional changes noted by patient:  Improvement in Sleep Patterns  Patient has noted adverse reaction to:  None    Functional Outcome Measure:  Upper Extremity Functional Index Score:  SCORE:   Column Totals: /80: 75   (A lower score indicates greater disability.)    Objective:  Pain Level (Scale 0-10):   4/6/2022 5/4/21 6/30/2022 8/25/2022   At Rest 0/10 0/10 0/10 0/10   With Use 5-7/10 4-5/10 Thumbs: 0/10  Wrists: 1-2/10 0/10     Pain Description:  Date 4/6/2022   Location [facility] hand   Pain Quality Shooting   Frequency intermittent     Pain is worst  daytime or nighttime   Exacerbated by  none   Relieved by stretch and otc brace   Progression Gradually improving       Edema  Mild    Sensation  Decreased Median Nerve distribution per pt report    ROM  Pain Report: - none  + mild    ++ moderate    +++ severe   Wrist 4/6/2022 4/6/2022 6/30/2022 8/25/2022   AROM (PROM) L R R R   Extension 76 64 70 73   Flexion 72 74 76 75   RD 14 18 (1/10 pain) 18    UD 34 33 39      Special Tests  Pain Report:  - none    + mild    ++ moderate    +++ severe    4/6/2022 6/30/2022 8/25/2022   Median Nerve Compression at Pronator L: -  R: + @ 5 secs R: + @ 27 secs R: + @ 15 secs   Carpal Compression Test--Durkan Test (30 sec) L: -  R: + @ 20 secs R: + @ 25 secs R: -   Francois Test for Lumbrical Incursion  (fist x 30  secs) L: -  R: -     Tinels at Carpal Tunnel L: -  R: + R: -    Phalens L: + @ 28 secs  R: - L: + @ 7 secs L: -     Neural Tension Testing  MNT: Median Neurodynamic Test (based on DS Martinez's ULNT)   4/6/2022 6/30/2022 8/25/2022   0-5 Scale L: 3/5  R: 3/5 L: 4/5  R: 4/5 L: 3/5  R: 4/5   Position:   0/5: Arm across abdomen in coronal plane  1/5: Depress shoulder, ER to neutral ABD shoulder to 45 degrees  2/5: ER shoulder to end range, keep elbow at 90 degrees  3/5: Extend elbow to 0 degrees  4/5: Fully supinate forearm  5/5: Extend wrist, fingers and thumb  Notes:    (+) indicates beyond grade level but less than FCI to next level    (-) indicates over FCI to level    S1  onset/change of patient's symptoms    S2 definite stop point based on patient's discomfort level    Strength   (Measured in pounds)  Pain Report: - none  + mild    ++ moderate    +++ severe    4/6/2022 4/6/2022 5/4/22 5/4/22 6/30/2022 6/30/2022 8/25/2022 8/25/2022   Trials L R L R L R L R   1  2  3 84 46 (1-2/10 pain) 69 55 79 68 80 53  60  61   Average 84 46 69 55 79 68 80 58     Lat Pinch 4/6/2022 4/6/2022 5/4/22 6/30/2022 8/25/2022   Trials L R R R R   1  2  3 18 11 12+ 18 + 18 +   Average 18 11  18 18     3 Pt Pinch 4/6/2022 4/6/2022 5/4/22 6/30/2022 8/25/2022   Trials L R R R R   1  2  3 12 9 10 15+ 12 +   Average 12 9  15 12       Thumb 5/4/2022 5/4/2022   AROM  (PROM) Right Left   MP /60 /60   IP /65 /65   RABD     PABD       Stage of Stenosing Tenosynovitis (SST)     5/4/20222022 6/30/2022 8/25/2022   Right thumb 3 2-3 1-2   Left thumb 3 3 2   Stage 1:  Normal  Stage 2:  Uneven motion of tendon  Stage 3:  Triggering, clicking, catching  Stage 4:  Locking in extension or flexion; unlocked by active motion  Stage 5:  Locking in extension or flexion; unlocked by passive motion  Stage 6:  Finger locked in extension or flexion    Palpation  Pain Report:  - none  + mild    ++ moderate    +++ severe   thumbs 5/4/2022 5/4/22 6/30/2022  6/30/2022 8/25/2022 8/25/2022    R L R L R L   A1 Pulley + ++ + +++ + +   PIP + + - -     CMC - -       FA Flexors         FA Extensors           Please refer to the daily flowsheet for treatment provided today.     Assessment:  Response to therapy has been improvement to:  Pain:  frequency is less, intensity of pain is decreased, duration of pain is decreased and less tender over affected area  Paresthesias:  Median nerve - less intense numbness and tingling, smaller area of involvement and less burning  Triggering thumbs is less.   Response to therapy has been lack of progress in:  ROM of Wrist:  Ext  and Flex  Strength:   and pinch    Overall Assessment:  Patient is becoming more independent in home exercise program  Patient would benefit from further evaluation of carpal tunnel symptoms with their referring provider.  STG/LTG:  LTGoals have been reviewed and progress or achievement has occurred:  see goal sheet for details and updates.    Plan:  Patient reports they are ready to discharge from hand therapy. They will independently manage their symptoms and HEP. If symptoms worsen, they are to schedule an appointment with their referring provider.     Home Exercise Program:  Carpal Tunnel Syndrome Prevention  Warmth  Moist heat for 5 minutes before exercises  Ball Massage to Flexors  Up to 2 minutes prior to exercises  Notes Include carpal tunnel area  Finger Active Range of Motion Tendon Glides Fist Series  Reps 10  Sessions per day 2-3  Notes hold each position 5 seconds  Finger Active Range of Motion FDS Flexor Tendon Gliding  Reps 10  Sessions per day 2-3  Nerve Gliding Proximal Median  Reps 10 (hold for 5 seconds)  Sessions per day 1-2  Thumb Passive Range of Motion IP Joint Flexion  HEP - Sets 3x/day  Reps 5  Notes hold 10-20 seconds  Thumb Passive Range of Motion MP Joint Flexion  HEP - Sets 3x/day  Reps 5  Notes hold 10-20 seconds  Thumb Passive Range of Motion Composite Flexion  HEP - Sets  3x/day  Reps 5  Notes hold 10-20 seconds  Thumb Passive Range of Motion Composite Extension  HEP - Sets 3x/day  Reps 5  Notes hold 10-20 seconds  Friction Massage  Dunlap Massage to Thumb

## 2023-01-08 ENCOUNTER — HEALTH MAINTENANCE LETTER (OUTPATIENT)
Age: 63
End: 2023-01-08

## 2023-03-31 DIAGNOSIS — I10 HYPERTENSION GOAL BP (BLOOD PRESSURE) < 140/90: ICD-10-CM

## 2023-03-31 RX ORDER — QUINAPRIL 40 MG/1
TABLET ORAL
Qty: 90 TABLET | Refills: 0 | Status: SHIPPED | OUTPATIENT
Start: 2023-03-31 | End: 2023-04-04

## 2023-03-31 RX ORDER — AMLODIPINE BESYLATE 5 MG/1
TABLET ORAL
Qty: 90 TABLET | Refills: 1 | Status: SHIPPED | OUTPATIENT
Start: 2023-03-31 | End: 2023-10-06

## 2023-04-04 ENCOUNTER — TELEPHONE (OUTPATIENT)
Dept: FAMILY MEDICINE | Facility: CLINIC | Age: 63
End: 2023-04-04
Payer: OTHER GOVERNMENT

## 2023-04-04 DIAGNOSIS — I10 HYPERTENSION GOAL BP (BLOOD PRESSURE) < 140/90: ICD-10-CM

## 2023-04-04 RX ORDER — LISINOPRIL 40 MG/1
40 TABLET ORAL DAILY
Qty: 90 TABLET | Refills: 1 | Status: SHIPPED | OUTPATIENT
Start: 2023-04-04 | End: 2024-04-09

## 2023-04-04 NOTE — TELEPHONE ENCOUNTER
Message from pharmacy: quinapril (ACCUPRIL) 40 MG tablet, is on long term back order. Please send an alternative.Anjelica Dunbar MA/TC

## 2023-04-18 ENCOUNTER — DOCUMENTATION ONLY (OUTPATIENT)
Dept: LAB | Facility: CLINIC | Age: 63
End: 2023-04-18

## 2023-04-18 ENCOUNTER — OFFICE VISIT (OUTPATIENT)
Dept: FAMILY MEDICINE | Facility: CLINIC | Age: 63
End: 2023-04-18
Payer: OTHER GOVERNMENT

## 2023-04-18 VITALS
DIASTOLIC BLOOD PRESSURE: 78 MMHG | OXYGEN SATURATION: 97 % | TEMPERATURE: 97.6 F | WEIGHT: 271 LBS | HEART RATE: 73 BPM | BODY MASS INDEX: 35.92 KG/M2 | RESPIRATION RATE: 16 BRPM | HEIGHT: 73 IN | SYSTOLIC BLOOD PRESSURE: 126 MMHG

## 2023-04-18 DIAGNOSIS — R73.9 HYPERGLYCEMIA: ICD-10-CM

## 2023-04-18 DIAGNOSIS — M72.2 PLANTAR FASCIITIS: ICD-10-CM

## 2023-04-18 DIAGNOSIS — Z12.5 SCREENING PSA (PROSTATE SPECIFIC ANTIGEN): ICD-10-CM

## 2023-04-18 DIAGNOSIS — Z13.6 CARDIOVASCULAR SCREENING; LDL GOAL LESS THAN 130: ICD-10-CM

## 2023-04-18 DIAGNOSIS — I10 HYPERTENSION GOAL BP (BLOOD PRESSURE) < 140/90: Primary | ICD-10-CM

## 2023-04-18 LAB
ALBUMIN SERPL-MCNC: 4.1 G/DL (ref 3.4–5)
ALP SERPL-CCNC: 67 U/L (ref 40–150)
ALT SERPL W P-5'-P-CCNC: 29 U/L (ref 0–70)
ANION GAP SERPL CALCULATED.3IONS-SCNC: 5 MMOL/L (ref 3–14)
AST SERPL W P-5'-P-CCNC: 15 U/L (ref 0–45)
BILIRUB SERPL-MCNC: 0.6 MG/DL (ref 0.2–1.3)
BUN SERPL-MCNC: 27 MG/DL (ref 7–30)
CALCIUM SERPL-MCNC: 9.8 MG/DL (ref 8.5–10.1)
CHLORIDE BLD-SCNC: 107 MMOL/L (ref 94–109)
CHOLEST SERPL-MCNC: 179 MG/DL
CO2 SERPL-SCNC: 25 MMOL/L (ref 20–32)
CREAT SERPL-MCNC: 1.04 MG/DL (ref 0.66–1.25)
FASTING STATUS PATIENT QL REPORTED: YES
GFR SERPL CREATININE-BSD FRML MDRD: 81 ML/MIN/1.73M2
GLUCOSE BLD-MCNC: 112 MG/DL (ref 70–99)
HDLC SERPL-MCNC: 56 MG/DL
LDLC SERPL CALC-MCNC: 107 MG/DL
NONHDLC SERPL-MCNC: 123 MG/DL
POTASSIUM BLD-SCNC: 4.2 MMOL/L (ref 3.4–5.3)
PROT SERPL-MCNC: 7.6 G/DL (ref 6.8–8.8)
PSA SERPL-MCNC: 1.64 UG/L (ref 0–4)
SODIUM SERPL-SCNC: 137 MMOL/L (ref 133–144)
TRIGL SERPL-MCNC: 82 MG/DL

## 2023-04-18 PROCEDURE — 99214 OFFICE O/P EST MOD 30 MIN: CPT | Performed by: FAMILY MEDICINE

## 2023-04-18 PROCEDURE — 80053 COMPREHEN METABOLIC PANEL: CPT | Performed by: FAMILY MEDICINE

## 2023-04-18 PROCEDURE — G0103 PSA SCREENING: HCPCS | Performed by: FAMILY MEDICINE

## 2023-04-18 PROCEDURE — 36415 COLL VENOUS BLD VENIPUNCTURE: CPT | Performed by: FAMILY MEDICINE

## 2023-04-18 PROCEDURE — 80061 LIPID PANEL: CPT | Performed by: FAMILY MEDICINE

## 2023-04-18 ASSESSMENT — PAIN SCALES - GENERAL: PAINLEVEL: MILD PAIN (2)

## 2023-04-18 NOTE — PROGRESS NOTES
You placed an order for A1C for this patient, unfortunately the he had already been drawn for prior placed labs and has left the building. Please have your team reach out to the patient if you would like him to return for a lab only visit.     Thank you,   Vivi Villa

## 2023-04-18 NOTE — PROGRESS NOTES
ASSESSMENT / PLAN:  (I10) Hypertension goal BP (blood pressure) < 140/90  (primary encounter diagnosis)  Comment: stable  Plan: Comprehensive metabolic panel        Continue exercise/diet and self-monitor. Chest pain or shortness of breath to er. Prn hydrochlorothiazide if needed    (Z12.5) Screening PSA (prostate specific antigen)  Plan: Prostate Specific Antigen Screen            (Z13.6) CARDIOVASCULAR SCREENING; LDL GOAL LESS THAN 130  Comment: ok in past  Plan: Lipid Profile        Continue exercise and weight loss    (R73.9) Hyperglycemia  Comment: pre-dm in past  Plan: Hemoglobin A1c        Stop all pop and lower carb diet/higher protein. Continue exercise. Yearly eye exams.   Consider metformin.     Referral placed for podiatry for history plantar fasciitis.     Subjective   Max is a 62 year old, presenting for the following health issues:  Hypertension, Arthritis, and Cts   Follow-up htn/obesity and hyperglycemia.  Fasting.  Water and sugar free.   Some regular pop. No relative fasting.   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 Tries no late night eating. Lots of walking at work.  No chest pain or shortness of breath. No nausea, vomiting or diarrhea or black/bloody stools. No urine changes or hematuria. No std concerns.  Sleep overall ok.   History carpel tunnel and plantar fascitiis- stretching..   No podiatry yet. Seen ortho in past. Wearing brace.   No regular ibuprofen.   Outside blood pressure readings ok.   Eye exam in past year. History cataract.   Sister with diabetes.       4/18/2023     7:04 AM   Additional Questions   Roomed by Carolyn     Arthritis    History of Present Illness       Hypertension: He presents for follow up of hypertension.  He does not check blood pressure  regularly outside of the clinic. Outpatient blood pressures have not been over 140/90. He follows a low salt diet.     He eats 0-1 servings of fruits and vegetables daily.He consumes 2 sweetened beverage(s) daily.He exercises with  "enough effort to increase his heart rate 30 to 60 minutes per day.  He exercises with enough effort to increase his heart rate 5 days per week. He is missing 2 dose(s) of medications per week.  He is not taking prescribed medications regularly due to other.           Review of Systems   Musculoskeletal: Positive for arthritis.        Objective    /78   Pulse 73   Temp 97.6  F (36.4  C) (Tympanic)   Resp 16   Ht 1.854 m (6' 1\")   Wt 122.9 kg (271 lb)   SpO2 97%   BMI 35.75 kg/m    Body mass index is 35.75 kg/m .  Physical Exam   GENERAL: healthy, alert and no distress  HENT: ear canals and TM's normal, nose and mouth without ulcers or lesions  NECK: no adenopathy, no asymmetry, masses, or scars and thyroid normal to palpation  RESP: lungs clear to auscultation - no rales, rhonchi or wheezes  CV: regular rate and rhythm, normal S1 S2, no S3 or S4, no murmur, click or rub, no peripheral edema and peripheral pulses strong  ABDOMEN: soft, nontender, no hepatosplenomegaly, no masses and bowel sounds normal  MS: no gross musculoskeletal defects noted, no edema  NEURO: Normal strength and tone, mentation intact and speech normal  PSYCH: mentation appears normal, affect normal/bright        "

## 2023-04-23 ENCOUNTER — HEALTH MAINTENANCE LETTER (OUTPATIENT)
Age: 63
End: 2023-04-23

## 2023-05-31 ENCOUNTER — ANCILLARY PROCEDURE (OUTPATIENT)
Dept: GENERAL RADIOLOGY | Facility: OTHER | Age: 63
End: 2023-05-31
Attending: PODIATRIST
Payer: OTHER GOVERNMENT

## 2023-05-31 ENCOUNTER — OFFICE VISIT (OUTPATIENT)
Dept: PODIATRY | Facility: OTHER | Age: 63
End: 2023-05-31
Payer: OTHER GOVERNMENT

## 2023-05-31 VITALS
DIASTOLIC BLOOD PRESSURE: 101 MMHG | SYSTOLIC BLOOD PRESSURE: 153 MMHG | HEIGHT: 73 IN | BODY MASS INDEX: 36.31 KG/M2 | WEIGHT: 274 LBS | HEART RATE: 79 BPM

## 2023-05-31 DIAGNOSIS — Q66.6 PES VALGUS OF RIGHT FOOT: ICD-10-CM

## 2023-05-31 DIAGNOSIS — M72.2 PLANTAR FASCIITIS: Primary | ICD-10-CM

## 2023-05-31 DIAGNOSIS — M72.2 PLANTAR FASCIITIS: ICD-10-CM

## 2023-05-31 DIAGNOSIS — M20.5X1 HALLUX LIMITUS OF RIGHT FOOT: ICD-10-CM

## 2023-05-31 PROCEDURE — 73630 X-RAY EXAM OF FOOT: CPT | Mod: TC | Performed by: RADIOLOGY

## 2023-05-31 PROCEDURE — 99203 OFFICE O/P NEW LOW 30 MIN: CPT | Performed by: PODIATRIST

## 2023-05-31 ASSESSMENT — PAIN SCALES - GENERAL: PAINLEVEL: MODERATE PAIN (5)

## 2023-05-31 NOTE — LETTER
"    5/31/2023         RE: Jeff Brown  [address] [facility] [address]        Dear Colleague,    Thank you for referring your patient, Jeff Brown, to [facility]. Please see a copy of my visit note below.    HPI:  Jeff Brown is a 62 year old male who is seen in consultation at the request of Margarito Casas MD    Pt presents for eval of:   (Onset, Location, L/R, Character, Treatments, Injury if yes)     XR Left and Right foot 5/31/2023    Onset 2018 plantar Left and Right ball of foot and arch of foot. No injury noted. Presents with New Balance athletic shoes. History of plantar fasciitis.  \"Buzzing\" sensation with activity, pain 5/10.    Dr. Childs's OTC inserts    Works at [employer], 8 hour work days/35-40 hour work week.      ROS: 10 point ROS neg other than the symptoms noted above in the HPI.    Patient Active Problem List   Diagnosis     CARDIOVASCULAR SCREENING; LDL GOAL LESS THAN 130     Hypertension goal BP (blood pressure) < 140/90     BMI 36.0-36.9,adult     Seasonal allergic rhinitis     Advanced directives, counseling/discussion     Morbid obesity (H)     Numbness and tingling of upper extremity     Wrist stiffness, unspecified laterality     Bilateral thumb pain       PAST MEDICAL HISTORY:   Past Medical History:   Diagnosis Date     Hypertension      PAST SURGICAL HISTORY:   Past Surgical History:   Procedure Laterality Date     COLONOSCOPY  2005     ENT SURGERY      tonsils     MEDICATIONS:   Current Outpatient Medications:      amLODIPine (NORVASC) 5 MG tablet, TAKE 1 TABLET(5 MG) BY MOUTH DAILY, Disp: 90 tablet, Rfl: 1     Ascorbic Acid (VITAMIN C PO), Take 500 mg by mouth daily, Disp: , Rfl:      aspirin (ASA) 81 MG tablet, Take 1 tablet by mouth daily., Disp: , Rfl:      [employer] Fiber Gummy Bears Children CHEW, , Disp: , Rfl:      FISH OIL, 2 tablets daily gummy, Disp: , Rfl:      lisinopril (ZESTRIL) 40 MG tablet, Take 1 tablet (40 mg) by mouth " daily Replaces accupril for blood pressure, Disp: 90 tablet, Rfl: 1     VITAMIN D, CHOLECALCIFEROL, PO, Take 2,000 Units by mouth daily, Disp: , Rfl:      chlorhexidine (PERIDEX) 0.12 % solution, , Disp: , Rfl:      hydrochlorothiazide (HYDRODIURIL) 25 MG tablet, Take 1 tablet (25 mg) by mouth daily In AM as needed for average blood pressure >140/90, Disp: 90 tablet, Rfl: 1  ALLERGIES:  No Known Allergies  SOCIAL HISTORY:   Social History     Socioeconomic History     Marital status: Single     Spouse name: Not on file     Number of children: 0     Years of education: Not on file     Highest education level: Not on file   Occupational History     Employer: [employer]   Tobacco Use     Smoking status: Never     Smokeless tobacco: Never   Vaping Use     Vaping status: Never Used   Substance and Sexual Activity     Alcohol use: No     Drug use: No     Sexual activity: Not Currently     Partners: Female   Other Topics Concern     Parent/sibling w/ CABG, MI or angioplasty before 65F 55M? No      Service Yes     Blood Transfusions No     Caffeine Concern No     Occupational Exposure No     Hobby Hazards No     Sleep Concern No     Stress Concern No     Weight Concern Yes     Special Diet No     Back Care Yes     Exercise Yes     Bike Helmet No     Seat Belt Yes     Self-Exams No   Social History Narrative     Not on file     Social Determinants of Health     Financial Resource Strain: Not on file   Food Insecurity: Not on file   Transportation Needs: Not on file   Physical Activity: Not on file   Stress: Not on file   Social Connections: Not on file   Intimate Partner Violence: Not on file   Housing Stability: Not on file     FAMILY HISTORY:   Family History   Problem Relation Age of Onset     Other Cancer Mother         Skin cancer.  Removed last year.     Thyroid Disease Mother      Heart Disease Father 65        htn     Hypertension Father         1st heart attack late 50s.  2nd mid-60s.       EXAM:Vitals: BP (!)  "153/101 (BP Location: Left arm, Patient Position: Sitting, Cuff Size: Adult Regular)   Pulse 79   Ht 1.854 m (6' 1\")   Wt 124.3 kg (274 lb)   BMI 36.15 kg/m    BMI= Body mass index is 36.15 kg/m .    General appearance: Patient is alert and fully cooperative with history & exam.  No sign of distress is noted during the visit.     Psychiatric: Affect is pleasant & appropriate.  Patient appears motivated to improve health.     Respiratory: Breathing is regular & unlabored while sitting.     HEENT: Hearing is intact to spoken word.  Speech is clear.  No gross evidence of visual impairment that would impact ambulation.     Vascular: DP & PT 2/4 & regular bilaterally.  No significant edema, rubor or varicosities noted.  CFT and skin temperature is normal to both lower extremities.       Neurologic: Lower extremity sensation is intact to light touch.  No evidence of weakness in the lower extremities.  No evidence of neuropathy and negative tinel sign.     Dermatologic: Skin is intact to both lower extremities without significant lesions, rash or abrasion.  Normal texture turgor and tone. No paronychia or evidence of soft tissue infection is noted.    Musculoskeletal: Patient is ambulatory without assistive device or brace. Pain is noted with firm palpation along the medial band of the plantar fascia left foot most notably at the origination upon the calcaneus not through the arch.  No pain with compression of the calcaneus medial to lateral or with palpation of the achilles, peroneal or posterior tibial tendons.  Slightly more than 0  of ankle joint dorsiflexion without crepitus or pain throughout the ankle, subtalar or midtarsal joints.  No pain or limitations throughout manual muscle strength testing plus 5/5 to all four quadrants bilateral.  No palpable edema noted.      Also tingling burning across the ball of both feet not localized to any specific joint or 1 musculotendinous structure but rather across the " entire ball of the foot.    Radiographs: Generalized valgus noted bilateral.  Mild degenerative changes to the first and second MPJs.  Plantar calcaneal osteophyte noted..     ASSESSMENT:       ICD-10-CM    1. Plantar fasciitis  M72.2 XR Foot Bilateral G/E 3 Views     Orthopedic  Referral          PLAN:  Reviewed patient's chart in [facility] and discussed etiology and treatment options.      Treatments:  5/31/2023  Obtained and interpreted radiographs.   Discontinue barefoot walking or unsupported walking in shoes without shank.  Dispensed written instructions to obtain appropriate shoe gear and/or OTC inserts.    No NSAIDs for age related risk  Prescription for custom molded orthotics 5/31/2023  Follow up in 4-5 weeks   Recommended orthotics and shoe gear with more forefoot cushion.      Jorge L Regalado DPM        Again, thank you for allowing me to participate in the care of your patient.        Sincerely,        Jorge L Regalado DPM

## 2023-05-31 NOTE — PATIENT INSTRUCTIONS
Reliable shoe stores: To maximize your experience and provide the best possible fit.  Be sure to show them your foot concerns and tell them Dr. Regalado sent you.      Stores listed in bold have only athletic shoes, and stores that are not bold are mostly casual or variety of shoes    [employer]  [address] [place] [address]  [phone]    [facility]  [address] [place] [address]  [phone]     [employer] [facility]  [address] [place] [address]  [phone]    [employer]  [address] [facility]  [facility] [address]  [phone]    [employer]  [address] [place], [place] [address] [phone]    [employer]  [address] [place] [address]  [phone]                         [employer] Locations:     [address] [place] [address]   [phone]     [address] [place] [address] [address] [place] [address]   [phone]     [address] [place] [address]   [phone]     [address] [facility] [place] [address]   [phone]     [address] [place] [address]   [phone]     [address] [place] [address]   [phone]     [address] [facility] [address]   [place] [address]   [phone]     [address] [facility]   [place] [address]   [phone]             How to find reasonable shoes          The correct width    Correct Fitting    Correct Length      Foot Distortion    Posture Distortion                          Torsional Rigidity      Grasp behind the heel and underneath the foot and twist      Bad    Excessive torsion/twist in midfoot     Less torsion/twist in midfoot is better                   Heel Counter Rigidity      Grasp just above   midsole and squeeze      Bad    Soft heel counter      Good    Rigid Heel Counter      Flexion Rigidity      Grasp shoe and bend from forefoot to rearfoot

## 2023-05-31 NOTE — PROGRESS NOTES
"HPI:  Jeff Brown is a 62 year old male who is seen in consultation at the request of Margarito Casas MD    Pt presents for eval of:   (Onset, Location, L/R, Character, Treatments, Injury if yes)     XR Left and Right foot 5/31/2023    Onset 2018 plantar Left and Right ball of foot and arch of foot. No injury noted. Presents with New Balance athletic shoes. History of plantar fasciitis.  \"Buzzing\" sensation with activity, pain 5/10.    Dr. Childs's OTC inserts    Works at [employer], 8 hour work days/35-40 hour work week.      ROS: 10 point ROS neg other than the symptoms noted above in the HPI.    Patient Active Problem List   Diagnosis     CARDIOVASCULAR SCREENING; LDL GOAL LESS THAN 130     Hypertension goal BP (blood pressure) < 140/90     BMI 36.0-36.9,adult     Seasonal allergic rhinitis     Advanced directives, counseling/discussion     Morbid obesity (H)     Numbness and tingling of upper extremity     Wrist stiffness, unspecified laterality     Bilateral thumb pain       PAST MEDICAL HISTORY:   Past Medical History:   Diagnosis Date     Hypertension      PAST SURGICAL HISTORY:   Past Surgical History:   Procedure Laterality Date     COLONOSCOPY  2005     ENT SURGERY      tonsils     MEDICATIONS:   Current Outpatient Medications:      amLODIPine (NORVASC) 5 MG tablet, TAKE 1 TABLET(5 MG) BY MOUTH DAILY, Disp: 90 tablet, Rfl: 1     Ascorbic Acid (VITAMIN C PO), Take 500 mg by mouth daily, Disp: , Rfl:      aspirin (ASA) 81 MG tablet, Take 1 tablet by mouth daily., Disp: , Rfl:      [employer] Fiber Gummy Bears Children CHEW, , Disp: , Rfl:      FISH OIL, 2 tablets daily gummy, Disp: , Rfl:      lisinopril (ZESTRIL) 40 MG tablet, Take 1 tablet (40 mg) by mouth daily Replaces accupril for blood pressure, Disp: 90 tablet, Rfl: 1     VITAMIN D, CHOLECALCIFEROL, PO, Take 2,000 Units by mouth daily, Disp: , Rfl:      chlorhexidine (PERIDEX) 0.12 % solution, , Disp: , Rfl:      hydrochlorothiazide (HYDRODIURIL) 25 " "MG tablet, Take 1 tablet (25 mg) by mouth daily In AM as needed for average blood pressure >140/90, Disp: 90 tablet, Rfl: 1  ALLERGIES:  No Known Allergies  SOCIAL HISTORY:   Social History     Socioeconomic History     Marital status: Single     Spouse name: Not on file     Number of children: 0     Years of education: Not on file     Highest education level: Not on file   Occupational History     Employer: [employer]   Tobacco Use     Smoking status: Never     Smokeless tobacco: Never   Vaping Use     Vaping status: Never Used   Substance and Sexual Activity     Alcohol use: No     Drug use: No     Sexual activity: Not Currently     Partners: Female   Other Topics Concern     Parent/sibling w/ CABG, MI or angioplasty before 65F 55M? No      Service Yes     Blood Transfusions No     Caffeine Concern No     Occupational Exposure No     Hobby Hazards No     Sleep Concern No     Stress Concern No     Weight Concern Yes     Special Diet No     Back Care Yes     Exercise Yes     Bike Helmet No     Seat Belt Yes     Self-Exams No   Social History Narrative     Not on file     Social Determinants of Health     Financial Resource Strain: Not on file   Food Insecurity: Not on file   Transportation Needs: Not on file   Physical Activity: Not on file   Stress: Not on file   Social Connections: Not on file   Intimate Partner Violence: Not on file   Housing Stability: Not on file     FAMILY HISTORY:   Family History   Problem Relation Age of Onset     Other Cancer Mother         Skin cancer.  Removed last year.     Thyroid Disease Mother      Heart Disease Father 65        htn     Hypertension Father         1st heart attack late 50s.  2nd mid-60s.       EXAM:Vitals: BP (!) 153/101 (BP Location: Left arm, Patient Position: Sitting, Cuff Size: Adult Regular)   Pulse 79   Ht 1.854 m (6' 1\")   Wt 124.3 kg (274 lb)   BMI 36.15 kg/m    BMI= Body mass index is 36.15 kg/m .    General appearance: Patient is alert and fully " cooperative with history & exam.  No sign of distress is noted during the visit.     Psychiatric: Affect is pleasant & appropriate.  Patient appears motivated to improve health.     Respiratory: Breathing is regular & unlabored while sitting.     HEENT: Hearing is intact to spoken word.  Speech is clear.  No gross evidence of visual impairment that would impact ambulation.     Vascular: DP & PT 2/4 & regular bilaterally.  No significant edema, rubor or varicosities noted.  CFT and skin temperature is normal to both lower extremities.       Neurologic: Lower extremity sensation is intact to light touch.  No evidence of weakness in the lower extremities.  No evidence of neuropathy and negative tinel sign.     Dermatologic: Skin is intact to both lower extremities without significant lesions, rash or abrasion.  Normal texture turgor and tone. No paronychia or evidence of soft tissue infection is noted.    Musculoskeletal: Patient is ambulatory without assistive device or brace. Pain is noted with firm palpation along the medial band of the plantar fascia left foot most notably at the origination upon the calcaneus not through the arch.  No pain with compression of the calcaneus medial to lateral or with palpation of the achilles, peroneal or posterior tibial tendons.  Slightly more than 0  of ankle joint dorsiflexion without crepitus or pain throughout the ankle, subtalar or midtarsal joints.  No pain or limitations throughout manual muscle strength testing plus 5/5 to all four quadrants bilateral.  No palpable edema noted.      Also tingling burning across the ball of both feet not localized to any specific joint or 1 musculotendinous structure but rather across the entire ball of the foot.    Radiographs: Generalized valgus noted bilateral.  Mild degenerative changes to the first and second MPJs.  Plantar calcaneal osteophyte noted..     ASSESSMENT:       ICD-10-CM    1. Plantar fasciitis  M72.2 XR Foot Bilateral G/E  3 Views     Orthopedic  Referral          PLAN:  Reviewed patient's chart in [facility] and discussed etiology and treatment options.      Treatments:  5/31/2023  Obtained and interpreted radiographs.   Discontinue barefoot walking or unsupported walking in shoes without shank.  Dispensed written instructions to obtain appropriate shoe gear and/or OTC inserts.    No NSAIDs for age related risk  Prescription for custom molded orthotics 5/31/2023  Follow up in 4-5 weeks   Recommended orthotics and shoe gear with more forefoot cushion.      Jorge L Regalado DPM

## 2023-06-01 ENCOUNTER — OFFICE VISIT (OUTPATIENT)
Dept: FAMILY MEDICINE | Facility: CLINIC | Age: 63
End: 2023-06-01
Payer: OTHER GOVERNMENT

## 2023-06-01 VITALS
RESPIRATION RATE: 20 BRPM | WEIGHT: 273.6 LBS | SYSTOLIC BLOOD PRESSURE: 132 MMHG | HEART RATE: 76 BPM | HEIGHT: 73 IN | OXYGEN SATURATION: 95 % | TEMPERATURE: 98.5 F | DIASTOLIC BLOOD PRESSURE: 78 MMHG | BODY MASS INDEX: 36.26 KG/M2

## 2023-06-01 DIAGNOSIS — H26.9 CATARACT OF BOTH EYES, UNSPECIFIED CATARACT TYPE: ICD-10-CM

## 2023-06-01 DIAGNOSIS — I10 HYPERTENSION GOAL BP (BLOOD PRESSURE) < 140/90: ICD-10-CM

## 2023-06-01 DIAGNOSIS — Z01.818 PREOP GENERAL PHYSICAL EXAM: Primary | ICD-10-CM

## 2023-06-01 PROCEDURE — 99214 OFFICE O/P EST MOD 30 MIN: CPT | Performed by: FAMILY MEDICINE

## 2023-06-01 RX ORDER — HYDROCHLOROTHIAZIDE 25 MG/1
25 TABLET ORAL DAILY
Qty: 90 TABLET | Refills: 1 | Status: SHIPPED | OUTPATIENT
Start: 2023-06-01 | End: 2024-04-09

## 2023-06-01 ASSESSMENT — PAIN SCALES - GENERAL: PAINLEVEL: NO PAIN (0)

## 2023-06-01 NOTE — PROGRESS NOTES
[facility]  [address] [address]  Phone: [phone]  Primary Provider: Ari Casas  Pre-op Performing Provider: ARI CASAS      PREOPERATIVE EVALUATION:  Today's date: 6/1/2023    Jeff Brown is a 62 year old male who presents for a preoperative evaluation.  Cataracts  Follow-up htn/obesity and hyperglycemia.  No chest pain or shortness of breath.   No fevers or chills. No nausea, vomiting or diarrhea or black/bloody stools.   S/p tonsillectomy in past.   Outside blood pressure readings ok.   Exercise- lots of walking. No urine changes.       6/1/2023     9:37 AM   Additional Questions   Roomed by DALJIT Gomez CMA     Forms 6/1/2023   Any forms needing to be completed Yes     Surgical Information:  Surgery/Procedure:Phacoemulsification with standard intraocular lens implant  Surgery Location: [facility]  Surgeon: Red Salmeron MD  Surgery Date: 6/15/2023-right, 6/29/2023-Left  Time of Surgery: 11:30 am  Where patient plans to recover: At home with family  Fax number for surgical facility: Note does not need to be faxed, will be available electronically in Epic.    ASSESSMENT / PLAN:  (Z01.818) Preop general physical exam  (primary encounter diagnosis)  Comment: generally healthy and normal exam. Denies chest pain or shortness of breath.   Plan: ok for low risk surgery.      (H26.9) Cataract of both eyes, unspecified cataract type    Plan: per eye md    (I10) Hypertension goal BP (blood pressure) < 140/90  Comment: stable  Plan: hydrochlorothiazide (HYDRODIURIL) 25 MG tablet        Continue self-monitor and meds and prn hydrochlorothiazide. Limit sodium and continue exercise. Lower carb diet. 10 lbs weight loss. Return to clinic if worse.         Subjective       HPI related to upcoming procedure: cataracts        5/30/2023     8:34 AM   Preop Questions   1. Have you ever had a heart attack or stroke? No   2. Have you ever had surgery on  your heart or blood vessels, such as a stent placement, a coronary artery bypass, or surgery on an artery in your head, neck, heart, or legs? No   3. Do you have chest pain with activity? No   4. Do you have a history of  heart failure? No   5. Do you currently have a cold, bronchitis or symptoms of other infection? No   6. Do you have a cough, shortness of breath, or wheezing? No   7. Do you or anyone in your family have previous history of blood clots? No   8. Do you or does anyone in your family have a serious bleeding problem such as prolonged bleeding following surgeries or cuts? No   9. Have you ever had problems with anemia or been told to take iron pills? No   10. Have you had any abnormal blood loss such as black, tarry or bloody stools? No   11. Have you ever had a blood transfusion? No   12. Are you willing to have a blood transfusion if it is medically needed before, during, or after your surgery? Yes   13. Have you or any of your relatives ever had problems with anesthesia? No   14. Do you have sleep apnea, excessive snoring or daytime drowsiness? No   15. Do you have any artifical heart valves or other implanted medical devices like a pacemaker, defibrillator, or continuous glucose monitor? No   16. Do you have artificial joints? No   17. Are you allergic to latex? No       Health Care Directive:  Patient does not have a Health Care Directive or Living Will: Discussed advance care planning with patient; however, patient declined at this time.    Preoperative Review of :   reviewed - no record of controlled substances prescribed.          Review of Systems      Patient Active Problem List    Diagnosis Date Noted     Bilateral thumb pain 04/08/2022     Priority: Medium     Numbness and tingling of upper extremity 04/06/2022     Priority: Medium     Wrist stiffness, unspecified laterality 04/06/2022     Priority: Medium     Morbid obesity (H) 03/10/2022     Priority: Medium     Advanced directives,  "counseling/discussion 12/31/2015     Priority: Medium     i gave a handout.        Seasonal allergic rhinitis 09/26/2013     Priority: Medium     BMI 36.0-36.9,adult 03/01/2013     Priority: Medium     Hypertension goal BP (blood pressure) < 140/90 09/25/2012     Priority: Medium     CARDIOVASCULAR SCREENING; LDL GOAL LESS THAN 130 09/09/2012     Priority: Medium      Past Medical History:   Diagnosis Date     Hypertension      Past Surgical History:   Procedure Laterality Date     COLONOSCOPY  2005     ENT SURGERY      tonsils     Current Outpatient Medications   Medication Sig Dispense Refill     amLODIPine (NORVASC) 5 MG tablet TAKE 1 TABLET(5 MG) BY MOUTH DAILY 90 tablet 1     Ascorbic Acid (VITAMIN C PO) Take 500 mg by mouth daily       aspirin (ASA) 81 MG tablet Take 1 tablet by mouth daily.       [employer] Fiber Gummy Bears Children CHEW        FISH OIL 2 tablets daily gummy       lisinopril (ZESTRIL) 40 MG tablet Take 1 tablet (40 mg) by mouth daily Replaces accupril for blood pressure 90 tablet 1     VITAMIN D, CHOLECALCIFEROL, PO Take 2,000 Units by mouth daily       chlorhexidine (PERIDEX) 0.12 % solution  (Patient not taking: Reported on 5/31/2023)       hydrochlorothiazide (HYDRODIURIL) 25 MG tablet Take 1 tablet (25 mg) by mouth daily In AM as needed for average blood pressure >140/90 90 tablet 1       No Known Allergies     Social History     Tobacco Use     Smoking status: Never     Smokeless tobacco: Never   Vaping Use     Vaping status: Never Used   Substance Use Topics     Alcohol use: No       History   Drug Use No         Objective     /78   Pulse 76   Temp 98.5  F (36.9  C) (Oral)   Resp 20   Ht 1.854 m (6' 1\")   Wt 124.1 kg (273 lb 9.6 oz)   SpO2 95%   BMI 36.10 kg/m      Physical Exam  GENERAL APPEARANCE: healthy, alert and no distress  EYES: cataracts  HENT: ear canals and TM's normal and nose and mouth without ulcers or lesions  NECK: no adenopathy, no asymmetry, masses, or scars and " thyroid normal to palpation  RESP: lungs clear to auscultation - no rales, rhonchi or wheezes  CV: regular rate and rhythm, normal S1 S2, no S3 or S4 and no murmur, click or rub   ABDOMEN: soft, nontender, no HSM or masses and bowel sounds normal  MS: extremities normal- no gross deformities noted  NEURO: Normal strength and tone, sensory exam grossly normal, mentation intact and speech normal  PSYCH: mentation appears normal and affect normal/bright, mildly anxious    Recent Labs   Lab Test 04/18/23  0745 03/03/22  0713   HGB  --  15.6   PLT  --  217    138   POTASSIUM 4.2 3.9   CR 1.04 1.07        Diagnostics:  No labs were ordered during this visit.   No EKG required for low risk surgery (cataract, skin procedure, breast biopsy, etc).    Revised Cardiac Risk Index (RCRI):  The patient has the following serious cardiovascular risks for perioperative complications:   - No serious cardiac risks = 0 points     RCRI Interpretation: 0 points: Class I (very low risk - 0.4% complication rate)           Signed Electronically by: Margarito Casas MD  Copy of this evaluation report is provided to requesting physician.

## 2023-06-01 NOTE — H&P (VIEW-ONLY)
[facility]  [address] [address] [address]  Phone: [phone]  Primary Provider: Ari Casas  Pre-op Performing Provider: ARI CASAS      PREOPERATIVE EVALUATION:  Today's date: 6/1/2023    Jeff Brown is a 62 year old male who presents for a preoperative evaluation.  Cataracts  Follow-up htn/obesity and hyperglycemia.  No chest pain or shortness of breath.   No fevers or chills. No nausea, vomiting or diarrhea or black/bloody stools.   S/p tonsillectomy in past.   Outside blood pressure readings ok.   Exercise- lots of walking. No urine changes.       6/1/2023     9:37 AM   Additional Questions   Roomed by DALJIT Gomez CMA     Forms 6/1/2023   Any forms needing to be completed Yes     Surgical Information:  Surgery/Procedure:Phacoemulsification with standard intraocular lens implant  Surgery Location: [facility]  Surgeon: Red Salmeron MD  Surgery Date: 6/15/2023-right, 6/29/2023-Left  Time of Surgery: 11:30 am  Where patient plans to recover: At home with family  Fax number for surgical facility: Note does not need to be faxed, will be available electronically in Epic.    ASSESSMENT / PLAN:  (Z01.818) Preop general physical exam  (primary encounter diagnosis)  Comment: generally healthy and normal exam. Denies chest pain or shortness of breath.   Plan: ok for low risk surgery.      (H26.9) Cataract of both eyes, unspecified cataract type    Plan: per eye md    (I10) Hypertension goal BP (blood pressure) < 140/90  Comment: stable  Plan: hydrochlorothiazide (HYDRODIURIL) 25 MG tablet        Continue self-monitor and meds and prn hydrochlorothiazide. Limit sodium and continue exercise. Lower carb diet. 10 lbs weight loss. Return to clinic if worse.         Subjective       HPI related to upcoming procedure: cataracts        5/30/2023     8:34 AM   Preop Questions   1. Have you ever had a heart attack or stroke? No   2. Have you ever had surgery on  your heart or blood vessels, such as a stent placement, a coronary artery bypass, or surgery on an artery in your head, neck, heart, or legs? No   3. Do you have chest pain with activity? No   4. Do you have a history of  heart failure? No   5. Do you currently have a cold, bronchitis or symptoms of other infection? No   6. Do you have a cough, shortness of breath, or wheezing? No   7. Do you or anyone in your family have previous history of blood clots? No   8. Do you or does anyone in your family have a serious bleeding problem such as prolonged bleeding following surgeries or cuts? No   9. Have you ever had problems with anemia or been told to take iron pills? No   10. Have you had any abnormal blood loss such as black, tarry or bloody stools? No   11. Have you ever had a blood transfusion? No   12. Are you willing to have a blood transfusion if it is medically needed before, during, or after your surgery? Yes   13. Have you or any of your relatives ever had problems with anesthesia? No   14. Do you have sleep apnea, excessive snoring or daytime drowsiness? No   15. Do you have any artifical heart valves or other implanted medical devices like a pacemaker, defibrillator, or continuous glucose monitor? No   16. Do you have artificial joints? No   17. Are you allergic to latex? No       Health Care Directive:  Patient does not have a Health Care Directive or Living Will: Discussed advance care planning with patient; however, patient declined at this time.    Preoperative Review of :   reviewed - no record of controlled substances prescribed.          Review of Systems      Patient Active Problem List    Diagnosis Date Noted     Bilateral thumb pain 04/08/2022     Priority: Medium     Numbness and tingling of upper extremity 04/06/2022     Priority: Medium     Wrist stiffness, unspecified laterality 04/06/2022     Priority: Medium     Morbid obesity (H) 03/10/2022     Priority: Medium     Advanced directives,  "counseling/discussion 12/31/2015     Priority: Medium     i gave a handout.        Seasonal allergic rhinitis 09/26/2013     Priority: Medium     BMI 36.0-36.9,adult 03/01/2013     Priority: Medium     Hypertension goal BP (blood pressure) < 140/90 09/25/2012     Priority: Medium     CARDIOVASCULAR SCREENING; LDL GOAL LESS THAN 130 09/09/2012     Priority: Medium      Past Medical History:   Diagnosis Date     Hypertension      Past Surgical History:   Procedure Laterality Date     COLONOSCOPY  2005     ENT SURGERY      tonsils     Current Outpatient Medications   Medication Sig Dispense Refill     amLODIPine (NORVASC) 5 MG tablet TAKE 1 TABLET(5 MG) BY MOUTH DAILY 90 tablet 1     Ascorbic Acid (VITAMIN C PO) Take 500 mg by mouth daily       aspirin (ASA) 81 MG tablet Take 1 tablet by mouth daily.       [employer] Fiber Gummy Bears Children CHEW        FISH OIL 2 tablets daily gummy       lisinopril (ZESTRIL) 40 MG tablet Take 1 tablet (40 mg) by mouth daily Replaces accupril for blood pressure 90 tablet 1     VITAMIN D, CHOLECALCIFEROL, PO Take 2,000 Units by mouth daily       chlorhexidine (PERIDEX) 0.12 % solution  (Patient not taking: Reported on 5/31/2023)       hydrochlorothiazide (HYDRODIURIL) 25 MG tablet Take 1 tablet (25 mg) by mouth daily In AM as needed for average blood pressure >140/90 90 tablet 1       No Known Allergies     Social History     Tobacco Use     Smoking status: Never     Smokeless tobacco: Never   Vaping Use     Vaping status: Never Used   Substance Use Topics     Alcohol use: No       History   Drug Use No         Objective     /78   Pulse 76   Temp 98.5  F (36.9  C) (Oral)   Resp 20   Ht 1.854 m (6' 1\")   Wt 124.1 kg (273 lb 9.6 oz)   SpO2 95%   BMI 36.10 kg/m      Physical Exam  GENERAL APPEARANCE: healthy, alert and no distress  EYES: cataracts  HENT: ear canals and TM's normal and nose and mouth without ulcers or lesions  NECK: no adenopathy, no asymmetry, masses, or scars and " thyroid normal to palpation  RESP: lungs clear to auscultation - no rales, rhonchi or wheezes  CV: regular rate and rhythm, normal S1 S2, no S3 or S4 and no murmur, click or rub   ABDOMEN: soft, nontender, no HSM or masses and bowel sounds normal  MS: extremities normal- no gross deformities noted  NEURO: Normal strength and tone, sensory exam grossly normal, mentation intact and speech normal  PSYCH: mentation appears normal and affect normal/bright, mildly anxious    Recent Labs   Lab Test 04/18/23  0745 03/03/22  0713   HGB  --  15.6   PLT  --  217    138   POTASSIUM 4.2 3.9   CR 1.04 1.07        Diagnostics:  No labs were ordered during this visit.   No EKG required for low risk surgery (cataract, skin procedure, breast biopsy, etc).    Revised Cardiac Risk Index (RCRI):  The patient has the following serious cardiovascular risks for perioperative complications:   - No serious cardiac risks = 0 points     RCRI Interpretation: 0 points: Class I (very low risk - 0.4% complication rate)           Signed Electronically by: Margarito Casas MD  Copy of this evaluation report is provided to requesting physician.

## 2023-06-01 NOTE — H&P (VIEW-ONLY)
[facility]  [address] [address] [address]  Phone: [phone]  Primary Provider: Ari Casas  Pre-op Performing Provider: ARI CASAS      PREOPERATIVE EVALUATION:  Today's date: 6/1/2023    Jeff Brown is a 62 year old male who presents for a preoperative evaluation.  Cataracts  Follow-up htn/obesity and hyperglycemia.  No chest pain or shortness of breath.   No fevers or chills. No nausea, vomiting or diarrhea or black/bloody stools.   S/p tonsillectomy in past.   Outside blood pressure readings ok.   Exercise- lots of walking. No urine changes.       6/1/2023     9:37 AM   Additional Questions   Roomed by DALJIT Gomez CMA     Forms 6/1/2023   Any forms needing to be completed Yes     Surgical Information:  Surgery/Procedure:Phacoemulsification with standard intraocular lens implant  Surgery Location: [facility]  Surgeon: Red Salmeron MD  Surgery Date: 6/15/2023-right, 6/29/2023-Left  Time of Surgery: 11:30 am  Where patient plans to recover: At home with family  Fax number for surgical facility: Note does not need to be faxed, will be available electronically in Epic.    ASSESSMENT / PLAN:  (Z01.818) Preop general physical exam  (primary encounter diagnosis)  Comment: generally healthy and normal exam. Denies chest pain or shortness of breath.   Plan: ok for low risk surgery.      (H26.9) Cataract of both eyes, unspecified cataract type    Plan: per eye md    (I10) Hypertension goal BP (blood pressure) < 140/90  Comment: stable  Plan: hydrochlorothiazide (HYDRODIURIL) 25 MG tablet        Continue self-monitor and meds and prn hydrochlorothiazide. Limit sodium and continue exercise. Lower carb diet. 10 lbs weight loss. Return to clinic if worse.         Subjective       HPI related to upcoming procedure: cataracts        5/30/2023     8:34 AM   Preop Questions   1. Have you ever had a heart attack or stroke? No   2. Have you ever had surgery on  your heart or blood vessels, such as a stent placement, a coronary artery bypass, or surgery on an artery in your head, neck, heart, or legs? No   3. Do you have chest pain with activity? No   4. Do you have a history of  heart failure? No   5. Do you currently have a cold, bronchitis or symptoms of other infection? No   6. Do you have a cough, shortness of breath, or wheezing? No   7. Do you or anyone in your family have previous history of blood clots? No   8. Do you or does anyone in your family have a serious bleeding problem such as prolonged bleeding following surgeries or cuts? No   9. Have you ever had problems with anemia or been told to take iron pills? No   10. Have you had any abnormal blood loss such as black, tarry or bloody stools? No   11. Have you ever had a blood transfusion? No   12. Are you willing to have a blood transfusion if it is medically needed before, during, or after your surgery? Yes   13. Have you or any of your relatives ever had problems with anesthesia? No   14. Do you have sleep apnea, excessive snoring or daytime drowsiness? No   15. Do you have any artifical heart valves or other implanted medical devices like a pacemaker, defibrillator, or continuous glucose monitor? No   16. Do you have artificial joints? No   17. Are you allergic to latex? No       Health Care Directive:  Patient does not have a Health Care Directive or Living Will: Discussed advance care planning with patient; however, patient declined at this time.    Preoperative Review of :   reviewed - no record of controlled substances prescribed.          Review of Systems      Patient Active Problem List    Diagnosis Date Noted     Bilateral thumb pain 04/08/2022     Priority: Medium     Numbness and tingling of upper extremity 04/06/2022     Priority: Medium     Wrist stiffness, unspecified laterality 04/06/2022     Priority: Medium     Morbid obesity (H) 03/10/2022     Priority: Medium     Advanced directives,  "counseling/discussion 12/31/2015     Priority: Medium     i gave a handout.        Seasonal allergic rhinitis 09/26/2013     Priority: Medium     BMI 36.0-36.9,adult 03/01/2013     Priority: Medium     Hypertension goal BP (blood pressure) < 140/90 09/25/2012     Priority: Medium     CARDIOVASCULAR SCREENING; LDL GOAL LESS THAN 130 09/09/2012     Priority: Medium      Past Medical History:   Diagnosis Date     Hypertension      Past Surgical History:   Procedure Laterality Date     COLONOSCOPY  2005     ENT SURGERY      tonsils     Current Outpatient Medications   Medication Sig Dispense Refill     amLODIPine (NORVASC) 5 MG tablet TAKE 1 TABLET(5 MG) BY MOUTH DAILY 90 tablet 1     Ascorbic Acid (VITAMIN C PO) Take 500 mg by mouth daily       aspirin (ASA) 81 MG tablet Take 1 tablet by mouth daily.       [employer] Fiber Gummy Bears Children CHEW        FISH OIL 2 tablets daily gummy       lisinopril (ZESTRIL) 40 MG tablet Take 1 tablet (40 mg) by mouth daily Replaces accupril for blood pressure 90 tablet 1     VITAMIN D, CHOLECALCIFEROL, PO Take 2,000 Units by mouth daily       chlorhexidine (PERIDEX) 0.12 % solution  (Patient not taking: Reported on 5/31/2023)       hydrochlorothiazide (HYDRODIURIL) 25 MG tablet Take 1 tablet (25 mg) by mouth daily In AM as needed for average blood pressure >140/90 90 tablet 1       No Known Allergies     Social History     Tobacco Use     Smoking status: Never     Smokeless tobacco: Never   Vaping Use     Vaping status: Never Used   Substance Use Topics     Alcohol use: No       History   Drug Use No         Objective     /78   Pulse 76   Temp 98.5  F (36.9  C) (Oral)   Resp 20   Ht 1.854 m (6' 1\")   Wt 124.1 kg (273 lb 9.6 oz)   SpO2 95%   BMI 36.10 kg/m      Physical Exam  GENERAL APPEARANCE: healthy, alert and no distress  EYES: cataracts  HENT: ear canals and TM's normal and nose and mouth without ulcers or lesions  NECK: no adenopathy, no asymmetry, masses, or scars and " thyroid normal to palpation  RESP: lungs clear to auscultation - no rales, rhonchi or wheezes  CV: regular rate and rhythm, normal S1 S2, no S3 or S4 and no murmur, click or rub   ABDOMEN: soft, nontender, no HSM or masses and bowel sounds normal  MS: extremities normal- no gross deformities noted  NEURO: Normal strength and tone, sensory exam grossly normal, mentation intact and speech normal  PSYCH: mentation appears normal and affect normal/bright, mildly anxious    Recent Labs   Lab Test 04/18/23  0745 03/03/22  0713   HGB  --  15.6   PLT  --  217    138   POTASSIUM 4.2 3.9   CR 1.04 1.07        Diagnostics:  No labs were ordered during this visit.   No EKG required for low risk surgery (cataract, skin procedure, breast biopsy, etc).    Revised Cardiac Risk Index (RCRI):  The patient has the following serious cardiovascular risks for perioperative complications:   - No serious cardiac risks = 0 points     RCRI Interpretation: 0 points: Class I (very low risk - 0.4% complication rate)           Signed Electronically by: Margarito Casas MD  Copy of this evaluation report is provided to requesting physician.

## 2023-06-14 ENCOUNTER — ANESTHESIA EVENT (OUTPATIENT)
Dept: SURGERY | Facility: CLINIC | Age: 63
End: 2023-06-14
Payer: OTHER GOVERNMENT

## 2023-06-15 ENCOUNTER — ANESTHESIA (OUTPATIENT)
Dept: SURGERY | Facility: CLINIC | Age: 63
End: 2023-06-15
Payer: OTHER GOVERNMENT

## 2023-06-15 ENCOUNTER — HOSPITAL ENCOUNTER (OUTPATIENT)
Facility: CLINIC | Age: 63
Discharge: HOME | End: 2023-06-15
Attending: INTERNAL MEDICINE | Admitting: INTERNAL MEDICINE
Payer: OTHER GOVERNMENT

## 2023-06-15 VITALS
RESPIRATION RATE: 20 BRPM | TEMPERATURE: 97.8 F | SYSTOLIC BLOOD PRESSURE: 156 MMHG | HEART RATE: 64 BPM | DIASTOLIC BLOOD PRESSURE: 102 MMHG | OXYGEN SATURATION: 97 %

## 2023-06-15 PROCEDURE — 250N000009 HC RX 250: Performed by: INTERNAL MEDICINE

## 2023-06-15 PROCEDURE — V2632 POST CHMBR INTRAOCULAR LENS: HCPCS | Performed by: INTERNAL MEDICINE

## 2023-06-15 PROCEDURE — 360N000007 HC CATARACT SURGICAL PACKAGE: Performed by: INTERNAL MEDICINE

## 2023-06-15 PROCEDURE — 250N000011 HC RX IP 250 OP 636: Performed by: NURSE ANESTHETIST, CERTIFIED REGISTERED

## 2023-06-15 PROCEDURE — 370N000004 HC ANESTHESIA CATARACT PACKAGE: Performed by: INTERNAL MEDICINE

## 2023-06-15 PROCEDURE — 761N000008 HC RECOVERY CATRACT PACKAGE: Performed by: INTERNAL MEDICINE

## 2023-06-15 PROCEDURE — 250N000009 HC RX 250: Performed by: NURSE ANESTHETIST, CERTIFIED REGISTERED

## 2023-06-15 PROCEDURE — 250N000011 HC RX IP 250 OP 636: Performed by: INTERNAL MEDICINE

## 2023-06-15 DEVICE — EYE IMP IOL AMO PCL TECNIS ZCB00 21.5: Type: IMPLANTABLE DEVICE | Site: EYE | Status: FUNCTIONAL

## 2023-06-15 RX ORDER — FENTANYL CITRATE 50 UG/ML
INJECTION, SOLUTION INTRAMUSCULAR; INTRAVENOUS PRN
Status: DISCONTINUED | OUTPATIENT
Start: 2023-06-15 | End: 2023-06-15

## 2023-06-15 RX ORDER — DICLOFENAC SODIUM 1 MG/ML
1 SOLUTION/ DROPS OPHTHALMIC
Status: COMPLETED | OUTPATIENT
Start: 2023-06-15 | End: 2023-06-15

## 2023-06-15 RX ORDER — LIDOCAINE 40 MG/G
CREAM TOPICAL
Status: DISCONTINUED | OUTPATIENT
Start: 2023-06-15 | End: 2023-06-15 | Stop reason: HOSPADM

## 2023-06-15 RX ORDER — MOXIFLOXACIN 5 MG/ML
1 SOLUTION/ DROPS OPHTHALMIC
Status: COMPLETED | OUTPATIENT
Start: 2023-06-15 | End: 2023-06-15

## 2023-06-15 RX ORDER — PROPARACAINE HYDROCHLORIDE 5 MG/ML
1 SOLUTION/ DROPS OPHTHALMIC ONCE
Status: DISCONTINUED | OUTPATIENT
Start: 2023-06-15 | End: 2023-06-15 | Stop reason: HOSPADM

## 2023-06-15 RX ORDER — CYCLOPENTOLATE HYDROCHLORIDE 10 MG/ML
1 SOLUTION/ DROPS OPHTHALMIC
Status: COMPLETED | OUTPATIENT
Start: 2023-06-15 | End: 2023-06-15

## 2023-06-15 RX ORDER — PROPARACAINE HYDROCHLORIDE 5 MG/ML
1 SOLUTION/ DROPS OPHTHALMIC ONCE
Status: COMPLETED | OUTPATIENT
Start: 2023-06-15 | End: 2023-06-15

## 2023-06-15 RX ORDER — PREDNISOLONE ACETATE 10 MG/ML
SUSPENSION/ DROPS OPHTHALMIC PRN
Status: DISCONTINUED | OUTPATIENT
Start: 2023-06-15 | End: 2023-06-15 | Stop reason: HOSPADM

## 2023-06-15 RX ORDER — PHENYLEPHRINE HYDROCHLORIDE 25 MG/ML
1 SOLUTION/ DROPS OPHTHALMIC
Status: COMPLETED | OUTPATIENT
Start: 2023-06-15 | End: 2023-06-15

## 2023-06-15 RX ADMIN — LIDOCAINE HYDROCHLORIDE 1 ML: 10 INJECTION, SOLUTION EPIDURAL; INFILTRATION; INTRACAUDAL; PERINEURAL at 10:30

## 2023-06-15 RX ADMIN — MOXIFLOXACIN 1 DROP: 5 SOLUTION/ DROPS OPHTHALMIC at 10:23

## 2023-06-15 RX ADMIN — MIDAZOLAM 1 MG: 1 INJECTION INTRAMUSCULAR; INTRAVENOUS at 11:03

## 2023-06-15 RX ADMIN — CYCLOPENTOLATE HYDROCHLORIDE 1 DROP: 10 SOLUTION/ DROPS OPHTHALMIC at 10:22

## 2023-06-15 RX ADMIN — PHENYLEPHRINE HYDROCHLORIDE 1 DROP: 25 SOLUTION/ DROPS OPHTHALMIC at 10:19

## 2023-06-15 RX ADMIN — DICLOFENAC SODIUM 1 DROP: 1 SOLUTION OPHTHALMIC at 10:17

## 2023-06-15 RX ADMIN — MOXIFLOXACIN 1 DROP: 5 SOLUTION/ DROPS OPHTHALMIC at 10:13

## 2023-06-15 RX ADMIN — CYCLOPENTOLATE HYDROCHLORIDE 1 DROP: 10 SOLUTION/ DROPS OPHTHALMIC at 10:17

## 2023-06-15 RX ADMIN — PROPARACAINE HYDROCHLORIDE 1 DROP: 5 SOLUTION/ DROPS OPHTHALMIC at 10:11

## 2023-06-15 RX ADMIN — PHENYLEPHRINE HYDROCHLORIDE 1 DROP: 25 SOLUTION/ DROPS OPHTHALMIC at 10:24

## 2023-06-15 RX ADMIN — MOXIFLOXACIN 1 DROP: 5 SOLUTION/ DROPS OPHTHALMIC at 10:18

## 2023-06-15 RX ADMIN — DICLOFENAC SODIUM 1 DROP: 1 SOLUTION OPHTHALMIC at 10:12

## 2023-06-15 RX ADMIN — FENTANYL CITRATE 50 MCG: 50 INJECTION, SOLUTION INTRAMUSCULAR; INTRAVENOUS at 11:03

## 2023-06-15 RX ADMIN — PHENYLEPHRINE HYDROCHLORIDE 1 DROP: 25 SOLUTION/ DROPS OPHTHALMIC at 10:13

## 2023-06-15 RX ADMIN — CYCLOPENTOLATE HYDROCHLORIDE 1 DROP: 10 SOLUTION/ DROPS OPHTHALMIC at 10:11

## 2023-06-15 RX ADMIN — DICLOFENAC SODIUM 1 DROP: 1 SOLUTION OPHTHALMIC at 10:22

## 2023-06-15 ASSESSMENT — ACTIVITIES OF DAILY LIVING (ADL): ADLS_ACUITY_SCORE: 35

## 2023-06-15 NOTE — OP NOTE
[facility]  Ophthalmology Operative Note    PREOPERATIVE DIAGNOSIS: Cataract, Right eye.     POSTOPERATIVE DIAGNOSIS: Cataract, Right eye.     OPERATION: Cataract extraction with placement of posterior chamber intraocular lens in the Right eye.     ANESTHESIA: MAC combined with topical     INDICATIONS FOR PROCEDURE: Jeff Brown was seen in the [facility] for decreased visual acuity in the Right eye. The patient was found to have a visually significant cataract in the Right eye. The risks, benefits, alternatives and goals of cataract extraction were discussed with the patient, and after adequate discussion the patient understood and agreed to these, and a signed informed consent was obtained prior to the procedure.     DESCRIPTION OF PROCEDURE: After proper patient identification, topical anesthesia was applied to the Right eye. The patient was brought to the operating room and the Right eye was prepped and draped in the usual sterile fashion for intraocular surgery. A lid speculum was placed in the Right eye. A paracentesis was then created and the anterior chamber was filled with 1% non-preserved lidocaine followed by Endocoat. A clear corneal incision was then created temporally using a 2.4mm keratome. A continuous curvilinear capsulorrhexis was then created using a cystotome and Utrata forceps. Hydrodissection was carried out with BSS on a cannula and the lens rotated freely within the capsular bag. Phacoemulsification was then carried out using the divide and conquer technique. Residual cortical material was removed using the I&A handpiece. The capsular bag was then filled with Healon and a ZCB00 21.5 diopter intraocular lens was then injected into the capsular bag. The lens showed good centration and stability. Residual viscoelastic was removed using the I&A handpiece. The wound was then hydrated and the anterior chamber reformed. Intracameral Moxifloxacin was  then injected into the anterior chamber. The wounds were then checked and found to be sealed. Topical Prednisolone drops were placed in the patient's Right eye followed by a Benjamin shield over the top of this. The patient tolerated the procedure well without complications and was told to follow up in the clinic in the next postoperative day.     Implant Name Type Inv. Item Serial No.  Lot No. LRB No. Used Action   EYE IMP IOL MERYL PCL TECNIS ZCB00 21.5 - [id number] Lens/Eye Implant EYE IMP IOL MERYL PCL TECNIS ZCB00 21.5 [id number] [employer]  Right 1 Implanted        Cj Salmeron MD

## 2023-06-15 NOTE — INTERVAL H&P NOTE
"I have reviewed the surgical (or preoperative) H&P that is linked to this encounter, and examined the patient. There are no significant changes    Clinical Conditions Present on Arrival:  Clinically Significant Risk Factors Present on Admission                 # Drug Induced Platelet Defect: home medication list includes an antiplatelet medication  # Obesity: Estimated body mass index is 36.1 kg/m  as calculated from the following:    Height as of 6/1/23: 1.854 m (6' 1\").    Weight as of 6/1/23: 124.1 kg (273 lb 9.6 oz).       "

## 2023-06-15 NOTE — LETTER
[facility]  [address] [place] [address]  Phone: [phone]      REPORT OF WORK ABILITY      Date: 6/15/2023                     Employee Name: Jeff Brown         YOB: 1960    Diagnosis: CATARACT SURGERY       RESTRICTIONS :    NO LIFTING OVER 20 LBS AND NO OVER EXERTIONAL ACTIVITY FOR 2 WEEKS    DR. JACOBO  [phone]

## 2023-06-15 NOTE — DISCHARGE INSTRUCTIONS
Eye Drops post cataract procedure    PREDNISOLONE ACETATE  3 times daily for 2 weeks,   Then 1 time daily for 1 week    MOXIFLOXACIN  3 times daily for 1 week    KETOROLAC  3 times daily for 2 weeks  Then stop         Status Epilepticus

## 2023-06-15 NOTE — ANESTHESIA CARE TRANSFER NOTE
Patient: Jeff Brown    Procedure: Procedure(s):  Phacoemulsification with standard intraocular lens implant       Diagnosis: Cataract [H26.9]  Diagnosis Additional Information: No value filed.    Anesthesia Type:   MAC     Note:    Oropharynx: oropharynx clear of all foreign objects and spontaneously breathing  Level of Consciousness: drowsy  Oxygen Supplementation: room air    Independent Airway: airway patency satisfactory and stable  Dentition: dentition unchanged  Vital Signs Stable: post-procedure vital signs reviewed and stable  Report to RN Given: handoff report given  Patient transferred to: Phase II    Handoff Report: Identifed the Patient, Identified the Reponsible Provider, Reviewed the pertinent medical history, Discussed the surgical course, Reviewed Intra-OP anesthesia mangement and issues during anesthesia, Set expectations for post-procedure period and Allowed opportunity for questions and acknowledgement of understanding      Vitals:  Vitals Value Taken Time   BP     Temp     Pulse     Resp     SpO2         Electronically Signed By: ROMINA Deluca CRNA  Yvonne 15, 2023  11:38 AM

## 2023-06-15 NOTE — ANESTHESIA PREPROCEDURE EVALUATION
Anesthesia Pre-Procedure Evaluation    Patient: Jeff Brown   MRN: [id number] : 1960        Procedure : Procedure(s):  Phacoemulsification with standard intraocular lens implant          Past Medical History:   Diagnosis Date     Hypertension       Past Surgical History:   Procedure Laterality Date     COLONOSCOPY  2005     ENT SURGERY      tonsils      No Known Allergies   Social History     Tobacco Use     Smoking status: Never     Smokeless tobacco: Never   Vaping Use     Vaping status: Never Used   Substance Use Topics     Alcohol use: No      Wt Readings from Last 1 Encounters:   23 124.1 kg (273 lb 9.6 oz)        Anesthesia Evaluation   Pt has had prior anesthetic. Type: General and MAC.        ROS/MED HX  ENT/Pulmonary:  - neg pulmonary ROS   (+) allergic rhinitis,     Neurologic: Comment: Numbness tingling upper extremity      Cardiovascular:     (+) hypertension-----Previous cardiac testing   Echo: Date: Results:    Stress Test: Date: Results:    ECG Reviewed: Date: 2021 Results:  SR  Cath: Date: Results:      METS/Exercise Tolerance:     Hematologic:       Musculoskeletal: Comment: Thumb/wrist pain  (+) arthritis,     GI/Hepatic:       Renal/Genitourinary:  - neg Renal ROS     Endo:     (+) Obesity,     Psychiatric/Substance Use:  - neg psychiatric ROS     Infectious Disease:  - neg infectious disease ROS     Malignancy:  - neg malignancy ROS     Other:  - neg other ROS          Physical Exam    Airway  airway exam normal      Mallampati: II   TM distance: > 3 FB   Neck ROM: full   Mouth opening: > 3 cm    Respiratory Devices and Support         Dental           Cardiovascular   cardiovascular exam normal       Rhythm and rate: regular and normal     Pulmonary   pulmonary exam normal        breath sounds clear to auscultation           OUTSIDE LABS:  CBC:   Lab Results   Component Value Date    WBC 8.6 2022    WBC 11.0 2021    HGB 15.6 2022    HGB 16.1 2021     HCT 46.9 03/03/2022    HCT 49.0 03/05/2021     03/03/2022     03/05/2021     BMP:   Lab Results   Component Value Date     04/18/2023     03/03/2022    POTASSIUM 4.2 04/18/2023    POTASSIUM 3.9 03/03/2022    CHLORIDE 107 04/18/2023    CHLORIDE 107 03/03/2022    CO2 25 04/18/2023    CO2 27 03/03/2022    BUN 27 04/18/2023    BUN 17 03/03/2022    CR 1.04 04/18/2023    CR 1.07 03/03/2022     (H) 04/18/2023     (H) 03/03/2022     COAGS: No results found for: PTT, INR, FIBR  POC: No results found for: BGM, HCG, HCGS  HEPATIC:   Lab Results   Component Value Date    ALBUMIN 4.1 04/18/2023    PROTTOTAL 7.6 04/18/2023    ALT 29 04/18/2023    AST 15 04/18/2023    ALKPHOS 67 04/18/2023    BILITOTAL 0.6 04/18/2023     OTHER:   Lab Results   Component Value Date    REID 9.8 04/18/2023    PHOS 3.0 03/27/2013       Anesthesia Plan    ASA Status:  2   NPO Status:  NPO Appropriate    Anesthesia Type: MAC.     - Reason for MAC: straight local not clinically adequate   Induction: Intravenous, Propofol.   Maintenance: TIVA.        Consents    Anesthesia Plan(s) and associated risks, benefits, and realistic alternatives discussed. Questions answered and patient/representative(s) expressed understanding.    - Discussed:     - Discussed with:  Patient      - Extended Intubation/Ventilatory Support Discussed: No.      - Patient is DNR/DNI Status: No    Use of blood products discussed: No .     Postoperative Care    Pain management: Oral pain medications.        Comments:    Other Comments: The risks and benefits of anesthesia, and the alternatives where applicable, have been discussed with the patient, and they wish to proceed.            ROMINA Deluca CRNA

## 2023-06-15 NOTE — ANESTHESIA POSTPROCEDURE EVALUATION
Patient: Jeff Brown    Procedure: Procedure(s):  Phacoemulsification with standard intraocular lens implant       Anesthesia Type:  MAC    Note:  Disposition: Outpatient   Postop Pain Control: Uneventful            Sign Out: Well controlled pain   PONV: No   Neuro/Psych: Uneventful            Sign Out: Acceptable/Baseline neuro status   Airway/Respiratory: Uneventful            Sign Out: Acceptable/Baseline resp. status   CV/Hemodynamics: Uneventful            Sign Out: Acceptable CV status   Other NRE: NONE   DID A NON-ROUTINE EVENT OCCUR? No    Event details/Postop Comments:  Pt was happy with anesthesia care.  No complications.  I will follow up with the pt if needed.           Last vitals:  Vitals Value Taken Time   BP     Temp     Pulse     Resp     SpO2 96 % 06/15/23 1156   Vitals shown include unvalidated device data.    Electronically Signed By: ROMINA Deluca CRNA  Yvonne 15, 2023  12:09 PM

## 2023-06-29 ENCOUNTER — ANESTHESIA EVENT (OUTPATIENT)
Dept: SURGERY | Facility: CLINIC | Age: 63
End: 2023-06-29
Payer: OTHER GOVERNMENT

## 2023-06-29 ENCOUNTER — HOSPITAL ENCOUNTER (OUTPATIENT)
Facility: CLINIC | Age: 63
Discharge: HOME | End: 2023-06-29
Attending: INTERNAL MEDICINE | Admitting: INTERNAL MEDICINE
Payer: OTHER GOVERNMENT

## 2023-06-29 ENCOUNTER — ANESTHESIA (OUTPATIENT)
Dept: SURGERY | Facility: CLINIC | Age: 63
End: 2023-06-29
Payer: OTHER GOVERNMENT

## 2023-06-29 VITALS
OXYGEN SATURATION: 96 % | WEIGHT: 273 LBS | RESPIRATION RATE: 18 BRPM | HEIGHT: 73 IN | TEMPERATURE: 98.1 F | DIASTOLIC BLOOD PRESSURE: 86 MMHG | BODY MASS INDEX: 36.18 KG/M2 | SYSTOLIC BLOOD PRESSURE: 134 MMHG | HEART RATE: 68 BPM

## 2023-06-29 PROCEDURE — V2632 POST CHMBR INTRAOCULAR LENS: HCPCS | Performed by: INTERNAL MEDICINE

## 2023-06-29 PROCEDURE — 250N000009 HC RX 250: Performed by: NURSE ANESTHETIST, CERTIFIED REGISTERED

## 2023-06-29 PROCEDURE — 360N000007 HC CATARACT SURGICAL PACKAGE: Performed by: INTERNAL MEDICINE

## 2023-06-29 PROCEDURE — 761N000008 HC RECOVERY CATRACT PACKAGE: Performed by: INTERNAL MEDICINE

## 2023-06-29 PROCEDURE — 250N000011 HC RX IP 250 OP 636: Performed by: NURSE ANESTHETIST, CERTIFIED REGISTERED

## 2023-06-29 PROCEDURE — 250N000011 HC RX IP 250 OP 636: Mod: JZ | Performed by: INTERNAL MEDICINE

## 2023-06-29 PROCEDURE — 250N000009 HC RX 250: Performed by: INTERNAL MEDICINE

## 2023-06-29 PROCEDURE — 370N000004 HC ANESTHESIA CATARACT PACKAGE: Performed by: INTERNAL MEDICINE

## 2023-06-29 DEVICE — EYE IMP IOL AMO PCL TECNIS ZCB00 21.5: Type: IMPLANTABLE DEVICE | Site: EYE | Status: FUNCTIONAL

## 2023-06-29 RX ORDER — PREDNISOLONE ACETATE 10 MG/ML
SUSPENSION/ DROPS OPHTHALMIC PRN
Status: DISCONTINUED | OUTPATIENT
Start: 2023-06-29 | End: 2023-06-29 | Stop reason: HOSPADM

## 2023-06-29 RX ORDER — LIDOCAINE 40 MG/G
CREAM TOPICAL
Status: DISCONTINUED | OUTPATIENT
Start: 2023-06-29 | End: 2023-06-29 | Stop reason: HOSPADM

## 2023-06-29 RX ORDER — PROPARACAINE HYDROCHLORIDE 5 MG/ML
1 SOLUTION/ DROPS OPHTHALMIC ONCE
Status: COMPLETED | OUTPATIENT
Start: 2023-06-29 | End: 2023-06-29

## 2023-06-29 RX ORDER — DICLOFENAC SODIUM 1 MG/ML
1 SOLUTION/ DROPS OPHTHALMIC
Status: COMPLETED | OUTPATIENT
Start: 2023-06-29 | End: 2023-06-29

## 2023-06-29 RX ORDER — PHENYLEPHRINE HYDROCHLORIDE 25 MG/ML
1 SOLUTION/ DROPS OPHTHALMIC
Status: COMPLETED | OUTPATIENT
Start: 2023-06-29 | End: 2023-06-29

## 2023-06-29 RX ORDER — FENTANYL CITRATE 50 UG/ML
INJECTION, SOLUTION INTRAMUSCULAR; INTRAVENOUS PRN
Status: DISCONTINUED | OUTPATIENT
Start: 2023-06-29 | End: 2023-06-29

## 2023-06-29 RX ORDER — PROPARACAINE HYDROCHLORIDE 5 MG/ML
1 SOLUTION/ DROPS OPHTHALMIC ONCE
Status: DISCONTINUED | OUTPATIENT
Start: 2023-06-29 | End: 2023-06-29 | Stop reason: HOSPADM

## 2023-06-29 RX ORDER — CYCLOPENTOLATE HYDROCHLORIDE 10 MG/ML
1 SOLUTION/ DROPS OPHTHALMIC
Status: COMPLETED | OUTPATIENT
Start: 2023-06-29 | End: 2023-06-29

## 2023-06-29 RX ORDER — ONDANSETRON 4 MG/1
4 TABLET, ORALLY DISINTEGRATING ORAL EVERY 30 MIN PRN
Status: CANCELLED | OUTPATIENT
Start: 2023-06-29

## 2023-06-29 RX ORDER — ONDANSETRON 2 MG/ML
4 INJECTION INTRAMUSCULAR; INTRAVENOUS EVERY 30 MIN PRN
Status: CANCELLED | OUTPATIENT
Start: 2023-06-29

## 2023-06-29 RX ORDER — MOXIFLOXACIN 5 MG/ML
1 SOLUTION/ DROPS OPHTHALMIC
Status: COMPLETED | OUTPATIENT
Start: 2023-06-29 | End: 2023-06-29

## 2023-06-29 RX ADMIN — MOXIFLOXACIN 1 DROP: 5 SOLUTION/ DROPS OPHTHALMIC at 10:24

## 2023-06-29 RX ADMIN — DICLOFENAC SODIUM 1 DROP: 1 SOLUTION OPHTHALMIC at 10:12

## 2023-06-29 RX ADMIN — PHENYLEPHRINE HYDROCHLORIDE 1 DROP: 25 SOLUTION/ DROPS OPHTHALMIC at 10:21

## 2023-06-29 RX ADMIN — CYCLOPENTOLATE HYDROCHLORIDE 1 DROP: 10 SOLUTION/ DROPS OPHTHALMIC at 09:59

## 2023-06-29 RX ADMIN — DICLOFENAC SODIUM 1 DROP: 1 SOLUTION OPHTHALMIC at 10:08

## 2023-06-29 RX ADMIN — CYCLOPENTOLATE HYDROCHLORIDE 1 DROP: 10 SOLUTION/ DROPS OPHTHALMIC at 09:58

## 2023-06-29 RX ADMIN — MIDAZOLAM 2 MG: 1 INJECTION INTRAMUSCULAR; INTRAVENOUS at 10:29

## 2023-06-29 RX ADMIN — DICLOFENAC SODIUM 1 DROP: 1 SOLUTION OPHTHALMIC at 10:06

## 2023-06-29 RX ADMIN — LIDOCAINE HYDROCHLORIDE 0.1 ML: 10 INJECTION, SOLUTION EPIDURAL; INFILTRATION; INTRACAUDAL; PERINEURAL at 10:13

## 2023-06-29 RX ADMIN — FENTANYL CITRATE 50 MCG: 50 INJECTION, SOLUTION INTRAMUSCULAR; INTRAVENOUS at 10:34

## 2023-06-29 RX ADMIN — MOXIFLOXACIN 1 DROP: 5 SOLUTION/ DROPS OPHTHALMIC at 10:22

## 2023-06-29 RX ADMIN — PROPARACAINE HYDROCHLORIDE 1 DROP: 5 SOLUTION/ DROPS OPHTHALMIC at 09:55

## 2023-06-29 RX ADMIN — PHENYLEPHRINE HYDROCHLORIDE 1 DROP: 25 SOLUTION/ DROPS OPHTHALMIC at 10:19

## 2023-06-29 RX ADMIN — CYCLOPENTOLATE HYDROCHLORIDE 1 DROP: 10 SOLUTION/ DROPS OPHTHALMIC at 10:02

## 2023-06-29 RX ADMIN — MOXIFLOXACIN 1 DROP: 5 SOLUTION/ DROPS OPHTHALMIC at 10:26

## 2023-06-29 RX ADMIN — PHENYLEPHRINE HYDROCHLORIDE 1 DROP: 25 SOLUTION/ DROPS OPHTHALMIC at 10:14

## 2023-06-29 RX ADMIN — FENTANYL CITRATE 50 MCG: 50 INJECTION, SOLUTION INTRAMUSCULAR; INTRAVENOUS at 10:36

## 2023-06-29 ASSESSMENT — ACTIVITIES OF DAILY LIVING (ADL): ADLS_ACUITY_SCORE: 35

## 2023-06-29 NOTE — LETTER
[facility] PHASE II  [address] [place] [address]  Phone: [phone]      REPORT OF WORK ABILITY    NOTE TO EMPLOYEE: You must promptly provide a copy of this report to your  employer or worker's compensation insurer, and Qualified Rehabilitation Consultant.    Date: 6/29/2023                     Employee Name: Jeff Brown         YOB: 1960  Diagnosis: Cataract surge     RESTRICTIONS IF ANY: No lifting over 20 lbs and no strenuous physical activity for 2 weeks       [facility] surgery  Dr. Perkins

## 2023-06-29 NOTE — ANESTHESIA PREPROCEDURE EVALUATION
Anesthesia Pre-Procedure Evaluation    Patient: Jeff Brown   MRN: [id number] : 1960        Procedure : Procedure(s):  Phacoemulsification with standard intraocular lens implant          Past Medical History:   Diagnosis Date     Hypertension       Past Surgical History:   Procedure Laterality Date     COLONOSCOPY       ENT SURGERY      tonsils     PHACOEMULSIFICATION WITH STANDARD INTRAOCULAR LENS IMPLANT Right 6/15/2023    Procedure: Phacoemulsification with standard intraocular lens implant, Right;  Surgeon: Red Salmeron MD;  Location: [facility] OR      No Known Allergies   Social History     Tobacco Use     Smoking status: Never     Smokeless tobacco: Never   Substance Use Topics     Alcohol use: No      Wt Readings from Last 1 Encounters:   23 124.1 kg (273 lb 9.6 oz)        Anesthesia Evaluation   Pt has had prior anesthetic. Type: General and MAC.        ROS/MED HX  ENT/Pulmonary:  - neg pulmonary ROS   (+) allergic rhinitis,     Neurologic: Comment: Numbness tingling upper extremity      Cardiovascular:     (+) hypertension-----Previous cardiac testing   Echo: Date: Results:    Stress Test: Date: Results:    ECG Reviewed: Date: 2021 Results:  SR  Cath: Date: Results:      METS/Exercise Tolerance:     Hematologic:       Musculoskeletal: Comment: Thumb/wrist pain  (+) arthritis,     GI/Hepatic:       Renal/Genitourinary:  - neg Renal ROS     Endo:     (+) Obesity,     Psychiatric/Substance Use:  - neg psychiatric ROS     Infectious Disease:  - neg infectious disease ROS     Malignancy:  - neg malignancy ROS     Other:  - neg other ROS          Physical Exam    Airway  airway exam normal      Mallampati: II   TM distance: > 3 FB   Neck ROM: full   Mouth opening: > 3 cm    Respiratory Devices and Support         Dental           Cardiovascular   cardiovascular exam normal       Rhythm and rate: regular and normal     Pulmonary   pulmonary exam normal        breath sounds clear  to auscultation           OUTSIDE LABS:  CBC:   Lab Results   Component Value Date    WBC 8.6 03/03/2022    WBC 11.0 03/05/2021    HGB 15.6 03/03/2022    HGB 16.1 03/05/2021    HCT 46.9 03/03/2022    HCT 49.0 03/05/2021     03/03/2022     03/05/2021     BMP:   Lab Results   Component Value Date     04/18/2023     03/03/2022    POTASSIUM 4.2 04/18/2023    POTASSIUM 3.9 03/03/2022    CHLORIDE 107 04/18/2023    CHLORIDE 107 03/03/2022    CO2 25 04/18/2023    CO2 27 03/03/2022    BUN 27 04/18/2023    BUN 17 03/03/2022    CR 1.04 04/18/2023    CR 1.07 03/03/2022     (H) 04/18/2023     (H) 03/03/2022     COAGS: No results found for: PTT, INR, FIBR  POC: No results found for: BGM, HCG, HCGS  HEPATIC:   Lab Results   Component Value Date    ALBUMIN 4.1 04/18/2023    PROTTOTAL 7.6 04/18/2023    ALT 29 04/18/2023    AST 15 04/18/2023    ALKPHOS 67 04/18/2023    BILITOTAL 0.6 04/18/2023     OTHER:   Lab Results   Component Value Date    REID 9.8 04/18/2023    PHOS 3.0 03/27/2013       Anesthesia Plan    ASA Status:  2   NPO Status:  NPO Appropriate    Anesthesia Type: MAC.     - Reason for MAC: straight local not clinically adequate              Consents    Anesthesia Plan(s) and associated risks, benefits, and realistic alternatives discussed. Questions answered and patient/representative(s) expressed understanding.     - Discussed: Risks, Benefits and Alternatives for BOTH SEDATION and the PROCEDURE were discussed     - Discussed with:  Patient      - Extended Intubation/Ventilatory Support Discussed: No.      - Patient is DNR/DNI Status: No    Use of blood products discussed: No .     Postoperative Care            Comments:    Other Comments: The risks and benefits of anesthesia, and the alternatives where applicable, have been discussed with the patient, and they wish to proceed.                ROMINA Stone CRNA

## 2023-06-29 NOTE — ANESTHESIA CARE TRANSFER NOTE
Patient: Jeff Brown    Procedure: Procedure(s):  Phacoemulsification with standard intraocular lens implant       Diagnosis: Cataract [H26.9]  Diagnosis Additional Information: No value filed.    Anesthesia Type:   MAC     Note:    Oropharynx: oropharynx clear of all foreign objects and spontaneously breathing  Level of Consciousness: awake  Oxygen Supplementation: room air    Independent Airway: airway patency satisfactory and stable  Dentition: dentition unchanged  Vital Signs Stable: post-procedure vital signs reviewed and stable  Report to RN Given: handoff report given  Patient transferred to: Phase II    Handoff Report: Identifed the Patient, Identified the Reponsible Provider, Reviewed the pertinent medical history, Discussed the surgical course, Reviewed Intra-OP anesthesia mangement and issues during anesthesia, Set expectations for post-procedure period and Allowed opportunity for questions and acknowledgement of understanding      Vitals:  Vitals Value Taken Time   BP     Temp     Pulse     Resp     SpO2         Electronically Signed By: ROMINA Holt CRNA  June 29, 2023  10:52 AM

## 2023-06-29 NOTE — DISCHARGE INSTRUCTIONS
Eye Drops post cataract procedure    PREDNISOLONE ACETATE  3 times daily for 2 weeks,   Then 1 time daily for 1 week    MOXIFLOXACIN  3 times daily for 1 week    KETOROLAC  3 times daily for 2 weeks  Then stop

## 2023-06-29 NOTE — OP NOTE
[facility]  Ophthalmology Operative Note    PREOPERATIVE DIAGNOSIS: Cataract, Left eye.     POSTOPERATIVE DIAGNOSIS: Cataract, Left eye.     OPERATION: Cataract extraction with placement of posterior chamber intraocular lens in the Left eye.     ANESTHESIA: MAC combined with topical     INDICATIONS FOR PROCEDURE: Jeff Brown was seen in the [facility] for decreased visual acuity in the Left eye. The patient was found to have a visually significant cataract in the Left eye. The risks, benefits, alternatives and goals of cataract extraction were discussed with the patient, and after adequate discussion the patient understood and agreed to these, and a signed informed consent was obtained prior to the procedure.     DESCRIPTION OF PROCEDURE: After proper patient identification, topical anesthesia was applied to the Left eye. The patient was brought to the operating room and the Left eye was prepped and draped in the usual sterile fashion for intraocular surgery. A lid speculum was placed in the Left eye. A paracentesis was then created and the anterior chamber was filled with 1% non-preserved lidocaine followed by Endocoat. A clear corneal incision was then created temporally using a 2.4mm keratome. A continuous curvilinear capsulorrhexis was then created using a cystotome and Utrata forceps. Hydrodissection was carried out with BSS on a cannula and the lens rotated freely within the capsular bag. Phacoemulsification was then carried out using the divide and conquer technique. Residual cortical material was removed using the I&A handpiece. The capsular bag was then filled with Healon and a ZCB00 21.5 diopter intraocular lens was then injected into the capsular bag. The lens showed good centration and stability. Residual viscoelastic was removed using the I&A handpiece. The wound was then hydrated and the anterior chamber reformed. Intracameral Moxifloxacin was then  injected into the anterior chamber. The wounds were then checked and found to be sealed. Topical Prednisolone drops were placed in the patient's Left eye followed by a Benjamin shield over the top of this. The patient tolerated the procedure well without complications and was told to follow up in the clinic in the next postoperative day.     Implant Name Type Inv. Item Serial No.  Lot No. LRB No. Used Action   EYE IMP IOL MERYL PCL TECNIS ZCB00 21.5 - [id number] Lens/Eye Implant EYE IMP IOL MERYL PCL TECNIS ZCB00 21.5 [id number] [employer]  Left 1 Implanted        Cj Salmeron MD

## 2023-06-29 NOTE — ANESTHESIA POSTPROCEDURE EVALUATION
Patient: Jeff Brown    Procedure: Procedure(s):  Phacoemulsification with standard intraocular lens implant       Anesthesia Type:  MAC    Note:  Disposition: Outpatient   Postop Pain Control: Uneventful            Sign Out: Well controlled pain   PONV: No   Neuro/Psych: Uneventful            Sign Out: Acceptable/Baseline neuro status   Airway/Respiratory: Uneventful            Sign Out: Acceptable/Baseline resp. status   CV/Hemodynamics: Uneventful            Sign Out: Acceptable CV status   Other NRE: NONE   DID A NON-ROUTINE EVENT OCCUR? No    Event details/Postop Comments:  Pt was happy with anesthesia care.  No complications.  I will follow up with the pt if needed.           Last vitals:  Vitals Value Taken Time   /86 06/29/23 1055   Temp     Pulse 68 06/29/23 1055   Resp 18 06/29/23 1055   SpO2 95 % 06/29/23 1103   Vitals shown include unvalidated device data.    Electronically Signed By: ROMINA Holt CRNA  June 29, 2023  11:04 AM

## 2023-10-06 DIAGNOSIS — I10 HYPERTENSION GOAL BP (BLOOD PRESSURE) < 140/90: ICD-10-CM

## 2023-10-06 RX ORDER — AMLODIPINE BESYLATE 5 MG/1
TABLET ORAL
Qty: 90 TABLET | Refills: 1 | Status: SHIPPED | OUTPATIENT
Start: 2023-10-06 | End: 2024-04-09

## 2024-04-09 ENCOUNTER — OFFICE VISIT (OUTPATIENT)
Dept: FAMILY MEDICINE | Facility: CLINIC | Age: 64
End: 2024-04-09
Payer: OTHER GOVERNMENT

## 2024-04-09 VITALS
HEART RATE: 80 BPM | HEIGHT: 73 IN | RESPIRATION RATE: 17 BRPM | OXYGEN SATURATION: 97 % | SYSTOLIC BLOOD PRESSURE: 135 MMHG | TEMPERATURE: 98.5 F | DIASTOLIC BLOOD PRESSURE: 79 MMHG | WEIGHT: 293 LBS | BODY MASS INDEX: 38.83 KG/M2

## 2024-04-09 DIAGNOSIS — Z00.00 ROUTINE GENERAL MEDICAL EXAMINATION AT A HEALTH CARE FACILITY: Primary | ICD-10-CM

## 2024-04-09 DIAGNOSIS — Z12.5 SCREENING PSA (PROSTATE SPECIFIC ANTIGEN): ICD-10-CM

## 2024-04-09 DIAGNOSIS — I10 HYPERTENSION GOAL BP (BLOOD PRESSURE) < 140/90: ICD-10-CM

## 2024-04-09 DIAGNOSIS — R73.9 HYPERGLYCEMIA: ICD-10-CM

## 2024-04-09 LAB — HBA1C MFR BLD: 5.6 % (ref 0–5.6)

## 2024-04-09 PROCEDURE — 80053 COMPREHEN METABOLIC PANEL: CPT | Performed by: FAMILY MEDICINE

## 2024-04-09 PROCEDURE — 99214 OFFICE O/P EST MOD 30 MIN: CPT | Mod: 25 | Performed by: FAMILY MEDICINE

## 2024-04-09 PROCEDURE — 83036 HEMOGLOBIN GLYCOSYLATED A1C: CPT | Performed by: FAMILY MEDICINE

## 2024-04-09 PROCEDURE — G0103 PSA SCREENING: HCPCS | Performed by: FAMILY MEDICINE

## 2024-04-09 PROCEDURE — 90715 TDAP VACCINE 7 YRS/> IM: CPT | Performed by: FAMILY MEDICINE

## 2024-04-09 PROCEDURE — 90471 IMMUNIZATION ADMIN: CPT | Performed by: FAMILY MEDICINE

## 2024-04-09 PROCEDURE — 36415 COLL VENOUS BLD VENIPUNCTURE: CPT | Performed by: FAMILY MEDICINE

## 2024-04-09 PROCEDURE — 99396 PREV VISIT EST AGE 40-64: CPT | Mod: 25 | Performed by: FAMILY MEDICINE

## 2024-04-09 RX ORDER — HYDROCHLOROTHIAZIDE 25 MG/1
25 TABLET ORAL DAILY
Qty: 90 TABLET | Refills: 1 | Status: SHIPPED | OUTPATIENT
Start: 2024-04-09

## 2024-04-09 RX ORDER — LISINOPRIL 40 MG/1
40 TABLET ORAL DAILY
Qty: 90 TABLET | Refills: 1 | Status: SHIPPED | OUTPATIENT
Start: 2024-04-09 | End: 2024-10-01

## 2024-04-09 RX ORDER — AMLODIPINE BESYLATE 5 MG/1
5 TABLET ORAL DAILY
Qty: 90 TABLET | Refills: 3 | Status: SHIPPED | OUTPATIENT
Start: 2024-04-09

## 2024-04-09 SDOH — HEALTH STABILITY: PHYSICAL HEALTH: ON AVERAGE, HOW MANY DAYS PER WEEK DO YOU ENGAGE IN MODERATE TO STRENUOUS EXERCISE (LIKE A BRISK WALK)?: 4 DAYS

## 2024-04-09 SDOH — HEALTH STABILITY: PHYSICAL HEALTH: ON AVERAGE, HOW MANY MINUTES DO YOU ENGAGE IN EXERCISE AT THIS LEVEL?: 40 MIN

## 2024-04-09 ASSESSMENT — PAIN SCALES - GENERAL: PAINLEVEL: MILD PAIN (2)

## 2024-04-09 ASSESSMENT — SOCIAL DETERMINANTS OF HEALTH (SDOH): HOW OFTEN DO YOU GET TOGETHER WITH FRIENDS OR RELATIVES?: NEVER

## 2024-04-09 NOTE — NURSING NOTE
Prior to immunization administration, verified patients identity using patient s name and date of birth. Please see Immunization Activity for additional information.     Screening Questionnaire for Adult Immunization    Are you sick today?   No   Do you have allergies to medications, food, a vaccine component or latex?   No   Have you ever had a serious reaction after receiving a vaccination?   No   Do you have a long-term health problem with heart, lung, kidney, or metabolic disease (e.g., diabetes), asthma, a blood disorder, no spleen, complement component deficiency, a cochlear implant, or a spinal fluid leak?  Are you on long-term aspirin therapy?   No   Do you have cancer, leukemia, HIV/AIDS, or any other immune system problem?   No   Do you have a parent, brother, or sister with an immune system problem?   No   In the past 3 months, have you taken medications that affect  your immune system, such as prednisone, other steroids, or anticancer drugs; drugs for the treatment of rheumatoid arthritis, Crohn s disease, or psoriasis; or have you had radiation treatments?   No   Have you had a seizure, or a brain or other nervous system problem?   No   During the past year, have you received a transfusion of blood or blood    products, or been given immune (gamma) globulin or antiviral drug?   No   For women: Are you pregnant or is there a chance you could become       pregnant during the next month?   No   Have you received any vaccinations in the past 4 weeks?   No     Immunization questionnaire answers were all negative.      Patient instructed to remain in clinic for 15 minutes afterwards, and to report any adverse reactions.     Screening performed by Mary Lou Gomez on 4/9/2024 at 1:10 PM.

## 2024-04-09 NOTE — PROGRESS NOTES
Preventive Care Visit  [facility]  Margarito Casas MD, Family Medicine  Apr 9, 2024      ASSESSMENT / PLAN:  (Z00.00) Routine general medical examination at a health care facility  (primary encounter diagnosis)  Comment: generally healthy and normal exam  Plan: Comprehensive metabolic panel        Await labs. Reviewed self mole/testicle check handout.  vitaminD   Follow-up derm if mole changes. Call/email with questions/concerns  ?AK on wrist.  (I10) Hypertension goal BP (blood pressure) < 140/90  Comment: stable  Plan: lisinopril (ZESTRIL) 40 MG tablet,         hydrochlorothiazide (HYDRODIURIL) 25 MG tablet,        amLODIPine (NORVASC) 5 MG tablet        Continue exercise and self-monitor. Chest pain or shortness of breath    (R73.9) Hyperglycemia  Comment: pre-dm  Plan: Hemoglobin A1c        Continue lower carb diet/exercise. Recheck in 6 months      (Z12.5) Screening PSA (prostate specific antigen)  Plan: Prostate Specific Antigen Screen              Subjective   Max is a 63 year old, presenting for the following:  Physical   Follow-up htn/obesity and hyperglycemia. Colonoscopy [facility] 2021 - repeat 2028   Outside blood pressure reading  140/80.   No chest pain or shortness of breath. Good exercise tolerance.   Emotionally doing ok. No nausea, vomiting or diarrhea or black/bloody stools.  History hemorrhoids in past. Fiber/water intake ok. Sugar free drinks. Sweet tooth. Eating more meat.   No urine changes or hematuira. No testicle pain/masses/hernia.   No mole changes. Taking vitaminD.       4/9/2024    12:45 PM   Additional Questions   Roomed by robert        Health Care Directive  Patient does not have a Health Care Directive or Living Will: Discussed advance care planning with patient; information given to patient to review.          4/9/2024   General Health   How would you rate your overall physical health? Good   Feel stress (tense, anxious, or unable to sleep) Not at all          4/9/2024   Nutrition   Three or more servings of calcium each day? (!) NO   Diet: Regular (no restrictions)   How many servings of fruit and vegetables per day? (!) 0-1   How many sweetened beverages each day? (!) 3         4/9/2024   Exercise   Days per week of moderate/strenous exercise 4 days   Average minutes spent exercising at this level 40 min         4/9/2024   Social Factors   Frequency of gathering with friends or relatives Never   Worry food won't last until get money to buy more No   Food not last or not have enough money for food? No   Do you have housing?  Yes   Are you worried about losing your housing? No   Lack of transportation? No   Unable to get utilities (heat,electricity)? No   (!) SOCIAL CONNECTIONS CONCERN      4/9/2024   Dental   Dentist two times every year? Yes         4/9/2024   TB Screening   Were you born outside of the [place]? No         Today's PHQ-2 Score:       4/9/2024    12:38 PM   PHQ-2 ( 1999 [employer])   Q1: Little interest or pleasure in doing things 0   Q2: Feeling down, depressed or hopeless 0   PHQ-2 Score 0   Q1: Little interest or pleasure in doing things Not at all   Q2: Feeling down, depressed or hopeless Not at all   PHQ-2 Score 0           4/9/2024   Substance Use   Alcohol more than 3/day or more than 7/wk No   Do you use any other substances recreationally? No     Social History     Tobacco Use    Smoking status: Never    Smokeless tobacco: Never   Vaping Use    Vaping Use: Never used   Substance Use Topics    Alcohol use: No    Drug use: No             4/9/2024   One time HIV Screening   Previous HIV test? No         4/9/2024   STI Screening   New sexual partner(s) since last STI/HIV test? No   Last PSA:   PSA   Date Value Ref Range Status   03/05/2021 1.74 0 - 4 ug/L Final     Comment:     Assay Method:  Chemiluminescence using Siemens Vista analyzer     Prostate Specific Antigen Screen   Date Value Ref Range Status   04/18/2023 1.64 0.00 - 4.00 ug/L Final     ASCVD Risk  "  The 10-year ASCVD risk score (Janae HURTADO, et al., 2019) is: 16.8%    Values used to calculate the score:      Age: 63 years      Sex: Male      Is Non- : No      Diabetic: No      Tobacco smoker: No      Systolic Blood Pressure: 166 mmHg      Is BP treated: Yes      HDL Cholesterol: 56 mg/dL      Total Cholesterol: 179 mg/dL           Reviewed and updated as needed this visit by Provider                             Objective    Exam  /79   Pulse 80   Temp 98.5  F (36.9  C) (Oral)   Resp 17   Ht 1.854 m (6' 1\")   Wt 132.9 kg (293 lb)   SpO2 97%   BMI 38.66 kg/m        Physical Exam  GENERAL: alert and no distress  EYES: Eyes grossly normal to inspection, PERRL and conjunctivae and sclerae normal  HENT: ear canals and TM's normal, nose and mouth without ulcers or lesions  NECK: no adenopathy, no asymmetry, masses, or scars  RESP: lungs clear to auscultation - no rales, rhonchi or wheezes  BREAST: normal without masses, tenderness or nipple discharge and no palpable axillary masses or adenopathy  CV: regular rate and rhythm, normal S1 S2, no S3 or S4, no murmur, click or rub, no peripheral edema   ABDOMEN: soft, nontender, no hepatosplenomegaly, no masses and bowel sounds normal   (male): patient deferred /rectal exams  MS: no gross musculoskeletal defects noted, no edema  SKIN: no suspicious lesions or rashes  SKIN: raised white wart like lesion right extensor forearm well circumscibed.   NEURO: Normal strength and tone, mentation intact and speech normal  PSYCH: mentation appears normal, affect normal/bright  LYMPH: no cervical, supraclavicular, axillary, or inguinal adenopathy        Signed Electronically by: Margarito Casas MD    "

## 2024-04-09 NOTE — PATIENT INSTRUCTIONS
Preventive Care Advice   This is general advice given by our system to help you stay healthy. However, your care team may have specific advice just for you. Please talk to your care team about your preventive care needs.  Nutrition  Eat 5 or more servings of fruits and vegetables each day.  Try wheat bread, brown rice and whole grain pasta (instead of white bread, rice, and pasta).  Get enough calcium and vitamin D. Check the label on foods and aim for 100% of the RDA (recommended daily allowance).  Lifestyle  Exercise at least 150 minutes each week   (30 minutes a day, 5 days a week).  Do muscle strengthening activities 2 days a week. These help control your weight and prevent disease.  No smoking.  Wear sunscreen to prevent skin cancer.  Have a dental exam and cleaning every 6 months.  Yearly exams  See your health care team every year to talk about:  Any changes in your health.  Any medicines your care team has prescribed.  Preventive care, family planning, and ways to prevent chronic diseases.  Shots (vaccines)   HPV shots (up to age 26), if you've never had them before.  Hepatitis B shots (up to age 59), if you've never had them before.  COVID-19 shot: Get this shot when it's due.  Flu shot: Get a flu shot every year.  Tetanus shot: Get a tetanus shot every 10 years.  Pneumococcal, hepatitis A, and RSV shots: Ask your care team if you need these based on your risk.  Shingles shot (for age 50 and up).  General health tests  Diabetes screening:  Starting at age 35, Get screened for diabetes at least every 3 years.  If you are younger than age 35, ask your care team if you should be screened for diabetes.  Cholesterol test: At age 39, start having a cholesterol test every 5 years, or more often if advised.  Bone density scan (DEXA): At age 50, ask your care team if you should have this scan for osteoporosis (brittle bones).  Hepatitis C: Get tested at least once in your life.  STIs (sexually transmitted  infections)  Before age 24: Ask your care team if you should be screened for STIs.  After age 24: Get screened for STIs if you're at risk. You are at risk for STIs (including HIV) if:  You are sexually active with more than one person.  You don't use condoms every time.  You or a partner was diagnosed with a sexually transmitted infection.  If you are at risk for HIV, ask about PrEP medicine to prevent HIV.  Get tested for HIV at least once in your life, whether you are at risk for HIV or not.  Cancer screening tests  Cervical cancer screening: If you have a cervix, begin getting regular cervical cancer screening tests at age 21. Most people who have regular screenings with normal results can stop after age 65. Talk about this with your provider.  Breast cancer scan (mammogram): If you've ever had breasts, begin having regular mammograms starting at age 40. This is a scan to check for breast cancer.  Colon cancer screening: It is important to start screening for colon cancer at age 45.  Have a colonoscopy test every 10 years (or more often if you're at risk) Or, ask your provider about stool tests like a FIT test every year or Cologuard test every 3 years.  To learn more about your testing options, visit: https://www.[email or web address]/066344.pdf.  For help making a decision, visit: https://bit.ly/hk49147.  Prostate cancer screening test: If you have a prostate and are age 55 to 69, ask your provider if you would benefit from a yearly prostate cancer screening test.  Lung cancer screening: If you are a current or former smoker age 50 to 80, ask your care team if ongoing lung cancer screenings are right for you.  For informational purposes only. Not to replace the advice of your health care provider. Copyright   2023 [employer]. All rights reserved. Clinically reviewed by the [facility] Transitions Program. [employer] 546435 - REV 01/24.    Relationships for Good Health  Relationships are important for  our health and happiness. Social isolation, loneliness and lack of support are bad for your health. Studies show that loneliness can harm health and limit your life span as much as high blood pressure and smoking.   Take some time to reflect on your relationships. Then answer these questions:  Are there people in your life that cause you stress or drain your energy? What can you do to set limits?  ________________________________________________________________________________________________________________________________________________________________________________________________________________________________________________________________________________________________________________________________________________  Who do you enjoy spending time with? Who can you go to for support?  ________________________________________________________________________________________________________________________________________________________________________________________________________________________________________________________________________________________________________________________________________________  What can you do to improve your relationships with others?  __________________________________________________________________________________________________________________________________________________________________________________________________________________  ______________________________________________________________________________________________________________________________  What do you like most about your relationships with others?  ________________________________________________________________________________________________________________________________________________________________________________________________________________________________________________________________________________________________________________________________________________  My  goal: ______________________________________________________________________  I will ______________________________________________________________________________________________________________________________________________________________________________________________    For informational purposes only. Not to replace the advice of your health care provider. Copyright   2018 [facility]. All rights reserved. Clinically reviewed by B[facility]ric Health  Team. [employer] [id number] - Rev 04/21.

## 2024-04-10 LAB
ALBUMIN SERPL BCG-MCNC: 4.3 G/DL (ref 3.5–5.2)
ALP SERPL-CCNC: 74 U/L (ref 40–150)
ALT SERPL W P-5'-P-CCNC: 40 U/L (ref 0–70)
ANION GAP SERPL CALCULATED.3IONS-SCNC: 11 MMOL/L (ref 7–15)
AST SERPL W P-5'-P-CCNC: 27 U/L (ref 0–45)
BILIRUB SERPL-MCNC: 0.5 MG/DL
BUN SERPL-MCNC: 17.4 MG/DL (ref 8–23)
CALCIUM SERPL-MCNC: 10.2 MG/DL (ref 8.8–10.2)
CHLORIDE SERPL-SCNC: 106 MMOL/L (ref 98–107)
CREAT SERPL-MCNC: 0.98 MG/DL (ref 0.67–1.17)
DEPRECATED HCO3 PLAS-SCNC: 21 MMOL/L (ref 22–29)
EGFRCR SERPLBLD CKD-EPI 2021: 87 ML/MIN/1.73M2
GLUCOSE SERPL-MCNC: 116 MG/DL (ref 70–99)
POTASSIUM SERPL-SCNC: 4.6 MMOL/L (ref 3.4–5.3)
PROT SERPL-MCNC: 7.2 G/DL (ref 6.4–8.3)
PSA SERPL DL<=0.01 NG/ML-MCNC: 1.58 NG/ML (ref 0–4.5)
SODIUM SERPL-SCNC: 138 MMOL/L (ref 135–145)

## 2024-10-01 DIAGNOSIS — I10 HYPERTENSION GOAL BP (BLOOD PRESSURE) < 140/90: ICD-10-CM

## 2024-10-01 RX ORDER — LISINOPRIL 40 MG/1
TABLET ORAL
Qty: 90 TABLET | Refills: 1 | Status: SHIPPED | OUTPATIENT
Start: 2024-10-01

## 2025-05-04 SDOH — HEALTH STABILITY: PHYSICAL HEALTH: ON AVERAGE, HOW MANY DAYS PER WEEK DO YOU ENGAGE IN MODERATE TO STRENUOUS EXERCISE (LIKE A BRISK WALK)?: 4 DAYS

## 2025-05-04 SDOH — HEALTH STABILITY: PHYSICAL HEALTH: ON AVERAGE, HOW MANY MINUTES DO YOU ENGAGE IN EXERCISE AT THIS LEVEL?: 40 MIN

## 2025-05-04 ASSESSMENT — SOCIAL DETERMINANTS OF HEALTH (SDOH): HOW OFTEN DO YOU GET TOGETHER WITH FRIENDS OR RELATIVES?: ONCE A WEEK

## 2025-05-05 ENCOUNTER — OFFICE VISIT (OUTPATIENT)
Dept: FAMILY MEDICINE | Facility: CLINIC | Age: 65
End: 2025-05-05
Payer: COMMERCIAL

## 2025-05-05 VITALS
HEIGHT: 73 IN | DIASTOLIC BLOOD PRESSURE: 82 MMHG | SYSTOLIC BLOOD PRESSURE: 132 MMHG | BODY MASS INDEX: 38.17 KG/M2 | RESPIRATION RATE: 12 BRPM | HEART RATE: 84 BPM | OXYGEN SATURATION: 98 % | WEIGHT: 288 LBS | TEMPERATURE: 97.9 F

## 2025-05-05 DIAGNOSIS — Z12.5 SCREENING PSA (PROSTATE SPECIFIC ANTIGEN): ICD-10-CM

## 2025-05-05 DIAGNOSIS — I10 HYPERTENSION GOAL BP (BLOOD PRESSURE) < 140/90: ICD-10-CM

## 2025-05-05 DIAGNOSIS — Z00.00 ROUTINE GENERAL MEDICAL EXAMINATION AT A HEALTH CARE FACILITY: Primary | ICD-10-CM

## 2025-05-05 DIAGNOSIS — Z13.6 CARDIOVASCULAR SCREENING; LDL GOAL LESS THAN 130: ICD-10-CM

## 2025-05-05 PROBLEM — E66.01 MORBID OBESITY (H): Status: RESOLVED | Noted: 2022-03-10 | Resolved: 2025-05-05

## 2025-05-05 LAB
ALBUMIN SERPL BCG-MCNC: 4.4 G/DL (ref 3.5–5.2)
ALP SERPL-CCNC: 63 U/L (ref 40–150)
ALT SERPL W P-5'-P-CCNC: 41 U/L (ref 0–70)
ANION GAP SERPL CALCULATED.3IONS-SCNC: 11 MMOL/L (ref 7–15)
AST SERPL W P-5'-P-CCNC: 32 U/L (ref 0–45)
BILIRUB SERPL-MCNC: 0.6 MG/DL
BUN SERPL-MCNC: 20.6 MG/DL (ref 8–23)
CALCIUM SERPL-MCNC: 10.1 MG/DL (ref 8.8–10.4)
CHLORIDE SERPL-SCNC: 105 MMOL/L (ref 98–107)
CHOLEST SERPL-MCNC: 182 MG/DL
CREAT SERPL-MCNC: 1.06 MG/DL (ref 0.67–1.17)
EGFRCR SERPLBLD CKD-EPI 2021: 78 ML/MIN/1.73M2
ERYTHROCYTE [DISTWIDTH] IN BLOOD BY AUTOMATED COUNT: 13.6 % (ref 10–15)
FASTING STATUS PATIENT QL REPORTED: YES
FASTING STATUS PATIENT QL REPORTED: YES
GLUCOSE SERPL-MCNC: 97 MG/DL (ref 70–99)
HCO3 SERPL-SCNC: 23 MMOL/L (ref 22–29)
HCT VFR BLD AUTO: 47.2 % (ref 40–53)
HDLC SERPL-MCNC: 45 MG/DL
HGB BLD-MCNC: 15.5 G/DL (ref 13.3–17.7)
LDLC SERPL CALC-MCNC: 119 MG/DL
MCH RBC QN AUTO: 28 PG (ref 26.5–33)
MCHC RBC AUTO-ENTMCNC: 32.8 G/DL (ref 31.5–36.5)
MCV RBC AUTO: 85 FL (ref 78–100)
NONHDLC SERPL-MCNC: 137 MG/DL
PLATELET # BLD AUTO: 210 10E3/UL (ref 150–450)
POTASSIUM SERPL-SCNC: 4.6 MMOL/L (ref 3.4–5.3)
PROT SERPL-MCNC: 7.7 G/DL (ref 6.4–8.3)
PSA SERPL DL<=0.01 NG/ML-MCNC: 1.62 NG/ML (ref 0–4.5)
RBC # BLD AUTO: 5.54 10E6/UL (ref 4.4–5.9)
SODIUM SERPL-SCNC: 139 MMOL/L (ref 135–145)
TRIGL SERPL-MCNC: 89 MG/DL
WBC # BLD AUTO: 7.8 10E3/UL (ref 4–11)

## 2025-05-05 PROCEDURE — 1126F AMNT PAIN NOTED NONE PRSNT: CPT | Performed by: FAMILY MEDICINE

## 2025-05-05 PROCEDURE — 3079F DIAST BP 80-89 MM HG: CPT | Performed by: FAMILY MEDICINE

## 2025-05-05 PROCEDURE — 80053 COMPREHEN METABOLIC PANEL: CPT | Performed by: FAMILY MEDICINE

## 2025-05-05 PROCEDURE — G0103 PSA SCREENING: HCPCS | Performed by: FAMILY MEDICINE

## 2025-05-05 PROCEDURE — 99213 OFFICE O/P EST LOW 20 MIN: CPT | Mod: 25 | Performed by: FAMILY MEDICINE

## 2025-05-05 PROCEDURE — 3075F SYST BP GE 130 - 139MM HG: CPT | Performed by: FAMILY MEDICINE

## 2025-05-05 PROCEDURE — 36415 COLL VENOUS BLD VENIPUNCTURE: CPT | Performed by: FAMILY MEDICINE

## 2025-05-05 PROCEDURE — 80061 LIPID PANEL: CPT | Performed by: FAMILY MEDICINE

## 2025-05-05 PROCEDURE — 85027 COMPLETE CBC AUTOMATED: CPT | Performed by: FAMILY MEDICINE

## 2025-05-05 PROCEDURE — 99396 PREV VISIT EST AGE 40-64: CPT | Performed by: FAMILY MEDICINE

## 2025-05-05 RX ORDER — LISINOPRIL 40 MG/1
40 TABLET ORAL DAILY
Qty: 90 TABLET | Refills: 3 | Status: SHIPPED | OUTPATIENT
Start: 2025-05-05

## 2025-05-05 RX ORDER — AMLODIPINE BESYLATE 5 MG/1
5 TABLET ORAL DAILY
Qty: 90 TABLET | Refills: 3 | Status: SHIPPED | OUTPATIENT
Start: 2025-05-05

## 2025-05-05 ASSESSMENT — PAIN SCALES - GENERAL: PAINLEVEL_OUTOF10: NO PAIN (0)

## 2025-05-05 NOTE — PATIENT INSTRUCTIONS
Patient Education   Preventive Care Advice   This is general advice given by our system to help you stay healthy. However, your care team may have specific advice just for you. Please talk to your care team about your preventive care needs.  Nutrition  Eat 5 or more servings of fruits and vegetables each day.  Try wheat bread, brown rice and whole grain pasta (instead of white bread, rice, and pasta).  Get enough calcium and vitamin D. Check the label on foods and aim for 100% of the RDA (recommended daily allowance).  Lifestyle  Exercise at least 150 minutes each week  (30 minutes a day, 5 days a week).  Do muscle strengthening activities 2 days a week. These help control your weight and prevent disease.  No smoking.  Wear sunscreen to prevent skin cancer.  Have a dental exam and cleaning every 6 months.  Yearly exams  See your health care team every year to talk about:  Any changes in your health.  Any medicines your care team has prescribed.  Preventive care, family planning, and ways to prevent chronic diseases.  Shots (vaccines)   HPV shots (up to age 26), if you've never had them before.  Hepatitis B shots (up to age 59), if you've never had them before.  COVID-19 shot: Get this shot when it's due.  Flu shot: Get a flu shot every year.  Tetanus shot: Get a tetanus shot every 10 years.  Pneumococcal, hepatitis A, and RSV shots: Ask your care team if you need these based on your risk.  Shingles shot (for age 50 and up)  General health tests  Diabetes screening:  Starting at age 35, Get screened for diabetes at least every 3 years.  If you are younger than age 35, ask your care team if you should be screened for diabetes.  Cholesterol test: At age 39, start having a cholesterol test every 5 years, or more often if advised.  Bone density scan (DEXA): At age 50, ask your care team if you should have this scan for osteoporosis (brittle bones).  Hepatitis C: Get tested at least once in your life.  STIs (sexually  transmitted infections)  Before age 24: Ask your care team if you should be screened for STIs.  After age 24: Get screened for STIs if you're at risk. You are at risk for STIs (including HIV) if:  You are sexually active with more than one person.  You don't use condoms every time.  You or a partner was diagnosed with a sexually transmitted infection.  If you are at risk for HIV, ask about PrEP medicine to prevent HIV.  Get tested for HIV at least once in your life, whether you are at risk for HIV or not.  Cancer screening tests  Cervical cancer screening: If you have a cervix, begin getting regular cervical cancer screening tests starting at age 21.  Breast cancer scan (mammogram): If you've ever had breasts, begin having regular mammograms starting at age 40. This is a scan to check for breast cancer.  Colon cancer screening: It is important to start screening for colon cancer at age 45.  Have a colonoscopy test every 10 years (or more often if you're at risk) Or, ask your provider about stool tests like a FIT test every year or Cologuard test every 3 years.  To learn more about your testing options, visit:   .  For help making a decision, visit:   https://bit.ly/bv41560.  Prostate cancer screening test: If you have a prostate, ask your care team if a prostate cancer screening test (PSA) at age 55 is right for you.  Lung cancer screening: If you are a current or former smoker ages 50 to 80, ask your care team if ongoing lung cancer screenings are right for you.  For informational purposes only. Not to replace the advice of your health care provider. Copyright   2023 [facility]. All rights reserved. Clinically reviewed by the [facility]. [employer] 338607 - REV 01/24.  Preventing Falls: Care Instructions  Injuries and health problems such as trouble walking or poor eyesight can increase your risk of falling. So can some medicines. But there are things you can do to help  "prevent falls. You can exercise to get stronger. You can also arrange your home to make it safer.    Talk to your doctor about the medicines you take. Ask if any of them increase the risk of falls and whether they can be changed or stopped.   Try to exercise regularly. It can help improve your strength and balance. This can help lower your risk of falling.         Practice fall safety and prevention.   Wear low-heeled shoes that fit well and give your feet good support. Talk to your doctor if you have foot problems that make this hard.  Carry a cellphone or wear a medical alert device that you can use to call for help.  Use stepladders instead of chairs to reach high objects. Don't climb if you're at risk for falls. Ask for help, if needed.  Wear the correct eyeglasses, if you need them.        Make your home safer.   Remove rugs, cords, clutter, and furniture from walkways.  Keep your house well lit. Use night-lights in hallways and bathrooms.  Install and use sturdy handrails on stairways.  Wear nonskid footwear, even inside. Don't walk barefoot or in socks without shoes.        Be safe outside.   Use handrails, curb cuts, and ramps whenever possible.  Keep your hands free by using a shoulder bag or backpack.  Try to walk in well-lit areas. Watch out for uneven ground, changes in pavement, and debris.  Be careful in the winter. Walk on the grass or gravel when sidewalks are slippery. Use de-icer on steps and walkways. Add non-slip devices to shoes.    Put grab bars and nonskid mats in your shower or tub and near the toilet. Try to use a shower chair or bath bench when bathing.   Get into a tub or shower by putting in your weaker leg first. Get out with your strong side first. Have a phone or medical alert device in the bathroom with you.   Where can you learn more?  Go to https://www.OneTeamVisiwise.net/patiented  Enter G117 in the search box to learn more about \"Preventing Falls: Care Instructions.\"  Current as of: " July 31, 2024  Content Version: 14.4    1086-9286 [employer].   Care instructions adapted under license by your healthcare professional. If you have questions about a medical condition or this instruction, always ask your healthcare professional. [employer] disclaims any warranty or liability for your use of this information.    Substance Use Disorder: Care Instructions  Overview     You can improve your life and health by stopping your use of alcohol or drugs. When you don't drink or use drugs, you may feel and sleep better. You may get along better with your family, friends, and coworkers. There are medicines and programs that can help with substance use disorder.  How can you care for yourself at home?  Here are some ways to help you stay sober and prevent relapse.  If you have been given medicine to help keep you sober or reduce your cravings, be sure to take it exactly as prescribed.  Talk to your doctor about programs that can help you stop using drugs or drinking alcohol.  Do not keep alcohol or drugs in your home.  Plan ahead. Think about what you'll say if other people ask you to drink or use drugs. Try not to spend time with people who drink or use drugs.  Use the time and money spent on drinking or drugs to do something that's important to you.  Preventing a relapse  Have a plan to deal with relapse. Learn to recognize changes in your thinking that lead you to drink or use drugs. Get help before you start to drink or use drugs again.  Try to stay away from situations, friends, or places that may lead you to drink or use drugs.  If you feel the need to drink alcohol or use drugs again, seek help right away. Call a trusted friend or family member. Some people get support from organizations such as Narcotics Anonymous or [employer]agon[employer].com or from treatment facilities.  If you relapse, get help as soon as you can. Some people make a plan with another person that outlines what they want  that person to do for them if they relapse. The plan usually includes how to handle the relapse and who to notify in case of relapse.  Don't give up. Remember that a relapse doesn't mean that you have failed. Use the experience to learn the triggers that lead you to drink or use drugs. Then quit again. Recovery is a lifelong process. Many people have several relapses before they are able to quit for good.  Follow-up care is a key part of your treatment and safety. Be sure to make and go to all appointments, and call your doctor if you are having problems. It's also a good idea to know your test results and keep a list of the medicines you take.  When should you call for help?   Call 911  anytime you think you may need emergency care. For example, call if you or someone else:    Has overdosed or has withdrawal signs. Be sure to tell the emergency workers that you are or someone else is using or trying to quit using drugs. Overdose or withdrawal signs may include:  Losing consciousness.  Seizure.  Seeing or hearing things that aren't there (hallucinations).     Is thinking or talking about suicide or harming others.   Where to get help 24 hours a day, 7 days a week   If you or someone you know talks about suicide, self-harm, a mental health crisis, a substance use crisis, or any other kind of emotional distress, get help right away. You can:    Call the Suicide and Crisis Lifeline at 983.     Call 5-754-210-GHVY (1-778.186.3897).     Text HOME to 940531 to access the Crisis Text Line.   Consider saving these numbers in your phone.  Go to [employer]oral[employer].org for more information or to chat online.  Call your doctor now or seek immediate medical care if:    You are having withdrawal symptoms. These may include nausea or vomiting, sweating, shakiness, and anxiety.   Watch closely for changes in your health, and be sure to contact your doctor if:    You have a relapse.     You need more help or support to stop.   Where can  "you learn more?  Go to https://www.Jolicloud.net/patiented  Enter H573 in the search box to learn more about \"Substance Use Disorder: Care Instructions.\"  Current as of: August 20, 2024  Content Version: 14.4    0712-4588 [employer].   Care instructions adapted under license by your healthcare professional. If you have questions about a medical condition or this instruction, always ask your healthcare professional. [employer] disclaims any warranty or liability for your use of this information.       "

## 2025-05-05 NOTE — PROGRESS NOTES
"Preventive Care Visit  [facility]  Margarito Casas MD, Family Medicine  May 5, 2025      Assessment & Plan     ASSESSMENT / PLAN:  (Z00.00) Routine general medical examination at a health care facility  (primary encounter diagnosis)  Comment: generally healthy and normal exam  Plan: CBC with platelets, Comprehensive metabolic         panel        Await labs. Reviewed self mole/testicle check handout.  Repeat colonoscopy 2028.    (I10) Hypertension goal BP (blood pressure) < 140/90  Comment: stable  Plan: amLODIPine (NORVASC) 5 MG tablet, lisinopril         (ZESTRIL) 40 MG tablet        Continue meds and prn hydrochlorothiazide. Chest pain or shortness of breath to er. Exercise.     (Z12.5) Screening PSA (prostate specific antigen)  Plan: Prostate Specific Antigen Screen            (Z13.6) CARDIOVASCULAR SCREENING; LDL GOAL LESS THAN 130  Comment: ok in past  Plan: Lipid Profile        Lower carb diet.      Patient has been advised of split billing requirements and indicates understanding: Yes        BMI  Estimated body mass index is 38 kg/m  as calculated from the following:    Height as of this encounter: 1.854 m (6' 1\").    Weight as of this encounter: 130.6 kg (288 lb).   Weight management plan: Discussed healthy diet and exercise guidelines    Counseling  Appropriate preventive services were addressed with this patient via screening, questionnaire, or discussion as appropriate for fall prevention, nutrition, physical activity, Tobacco-use cessation, social engagement, weight loss and cognition.  Checklist reviewing preventive services available has been given to the patient.  Reviewed patient's diet, addressing concerns and/or questions.           Subjective   Max is a 64 year old, presenting for the following:  Physical  Follow-up htn/obesity and hyperglycemia. Colonoscopy [facility] 2021 - repeat 2028   Outside blood pressure reading <140/90.   No chest pain or shortness of breath. Good " exercise tolerance.   Emotionally doing ok. No nausea, vomiting or diarrhea or black/bloody stools.  History hemorrhoids in past. Fiber/water intake ok. Sugar free drinks. Sweet tooth. Eating more meat.   No urine changes or hematuira. No testicle pain/masses/hernia. No std concerns.  No mole changes. Taking vitaminD.   Chronic knee pain- doing some exercise. Chronic feet issues- seen podiatry. Melatonin for sleep helpful.   Working at [employer] - active.       5/5/2025     8:26 AM   Additional Questions   Roomed by Kylee   Accompanied by self          HPI       Advance Care Planning    Discussed advance care planning with patient; however, patient declined at this time.        5/4/2025   General Health   How would you rate your overall physical health? (!) FAIR   Feel stress (tense, anxious, or unable to sleep) Not at all         5/4/2025   Nutrition   Three or more servings of calcium each day? (!) NO   Diet: Regular (no restrictions)   How many servings of fruit and vegetables per day? (!) 0-1   How many sweetened beverages each day? (!) 3         5/4/2025   Exercise   Days per week of moderate/strenous exercise 4 days   Average minutes spent exercising at this level 40 min         5/4/2025   Social Factors   Frequency of gathering with friends or relatives Once a week   Worry food won't last until get money to buy more No   Food not last or not have enough money for food? No   Do you have housing? (Housing is defined as stable permanent housing and does not include staying outside in a car, in a tent, in an abandoned building, in an overnight shelter, or couch-surfing.) Yes   Are you worried about losing your housing? No   Lack of transportation? No   Unable to get utilities (heat,electricity)? No         5/5/2025   Fall Risk   Gait Speed Test (Document in seconds) 4.8   Gait Speed Test Interpretation Less than or equal to 5.00 seconds - PASS          5/4/2025   Dental   Dentist two times every year? Yes  "        Today's PHQ-2 Score:       5/4/2025     9:43 PM   PHQ-2 ( 1999 [employer])   Q1: Little interest or pleasure in doing things 0   Q2: Feeling down, depressed or hopeless 0   PHQ-2 Score 0    Q1: Little interest or pleasure in doing things Not at all   Q2: Feeling down, depressed or hopeless Not at all   PHQ-2 Score 0       Patient-reported           5/4/2025   Substance Use   Alcohol more than 3/day or more than 7/wk Not Applicable   Do you use any other substances recreationally? (!) OTHER     Social History     Tobacco Use    Smoking status: Never    Smokeless tobacco: Never   Vaping Use    Vaping status: Never Used   Substance Use Topics    Alcohol use: No    Drug use: No             5/4/2025   One time HIV Screening   Previous HIV test? No         5/4/2025   STI Screening   New sexual partner(s) since last STI/HIV test? No   Last PSA:   PSA   Date Value Ref Range Status   03/05/2021 1.74 0 - 4 ug/L Final     Comment:     Assay Method:  Chemiluminescence using Siemens Vista analyzer     Prostate Specific Antigen Screen   Date Value Ref Range Status   04/09/2024 1.58 0.00 - 4.50 ng/mL Final   04/18/2023 1.64 0.00 - 4.00 ug/L Final     ASCVD Risk   The 10-year ASCVD risk score (Janae HURTADO, et al., 2019) is: 14.4%    Values used to calculate the score:      Age: 64 years      Sex: Male      Is Non- : No      Diabetic: No      Tobacco smoker: No      Systolic Blood Pressure: 144 mmHg      Is BP treated: Yes      HDL Cholesterol: 56 mg/dL      Total Cholesterol: 179 mg/dL           Reviewed and updated as needed this visit by Provider                             Objective    Exam  /82   Pulse 84   Temp 97.9  F (36.6  C) (Tympanic)   Resp 12   Ht 1.854 m (6' 1\")   Wt 130.6 kg (288 lb)   SpO2 98%   BMI 38.00 kg/m        Physical Exam  GENERAL: alert and no distress  EYES: Eyes grossly normal to inspection, PERRL and conjunctivae and sclerae normal  HENT: ear canals and " TM's normal, nose and mouth without ulcers or lesions  NECK: no adenopathy, no asymmetry, masses, or scars  RESP: lungs clear to auscultation - no rales, rhonchi or wheezes  BREAST: normal without masses, tenderness or nipple discharge and no palpable axillary masses or adenopathy  CV: regular rate and rhythm, normal S1 S2, no S3 or S4, no murmur, click or rub, no peripheral edema   ABDOMEN: soft, nontender, no hepatosplenomegaly, no masses and bowel sounds normal   (male): patient deferred /rectal exams  MS: no gross musculoskeletal defects noted, no edema  SKIN: no suspicious lesions or rashes  NEURO: Normal strength and tone, mentation intact and speech normal  PSYCH: mentation appears normal, affect normal/bright        Signed Electronically by: Margairto Casas MD

## (undated) DEVICE — SOL WATER IRRIG 1000ML BOTTLE 07139-09

## (undated) DEVICE — SOL NACL 0.9% IRRIG 1000ML BOTTLE 07138-09

## (undated) RX ORDER — FENTANYL CITRATE 50 UG/ML
INJECTION, SOLUTION INTRAMUSCULAR; INTRAVENOUS
Status: DISPENSED
Start: 2023-06-29

## (undated) RX ORDER — FENTANYL CITRATE 50 UG/ML
INJECTION, SOLUTION INTRAMUSCULAR; INTRAVENOUS
Status: DISPENSED
Start: 2023-06-15